# Patient Record
Sex: MALE | Race: BLACK OR AFRICAN AMERICAN | NOT HISPANIC OR LATINO | URBAN - METROPOLITAN AREA
[De-identification: names, ages, dates, MRNs, and addresses within clinical notes are randomized per-mention and may not be internally consistent; named-entity substitution may affect disease eponyms.]

---

## 2024-02-01 ENCOUNTER — HOSPITAL ENCOUNTER (OUTPATIENT)
Dept: BEHAVIORAL HEALTH | Facility: CLINIC | Age: 39
Discharge: HOME | End: 2024-02-01
Attending: PSYCHIATRY & NEUROLOGY | Admitting: PSYCHIATRY & NEUROLOGY
Payer: COMMERCIAL

## 2024-02-01 VITALS — HEIGHT: 71 IN | BODY MASS INDEX: 35.7 KG/M2 | WEIGHT: 255 LBS

## 2024-02-01 DIAGNOSIS — F14.20 COCAINE USE DISORDER, SEVERE, DEPENDENCE (H): ICD-10-CM

## 2024-02-01 DIAGNOSIS — F12.20 CANNABIS USE DISORDER, SEVERE, DEPENDENCE (H): ICD-10-CM

## 2024-02-01 DIAGNOSIS — F10.20 ALCOHOL USE DISORDER, SEVERE, DEPENDENCE (H): Primary | ICD-10-CM

## 2024-02-01 PROCEDURE — H0001 ALCOHOL AND/OR DRUG ASSESS: HCPCS

## 2024-02-01 RX ORDER — ALBUTEROL SULFATE 90 UG/1
2 AEROSOL, METERED RESPIRATORY (INHALATION) PRN
COMMUNITY
End: 2024-08-21

## 2024-02-01 ASSESSMENT — COLUMBIA-SUICIDE SEVERITY RATING SCALE - C-SSRS
REASONS FOR IDEATION LIFETIME: COMPLETELY TO END OR STOP THE PAIN (YOU COULDN'T GO ON LIVING WITH THE PAIN OR HOW YOU WERE FEELING)
TOTAL  NUMBER OF ABORTED OR SELF INTERRUPTED ATTEMPTS LIFETIME: NO
ATTEMPT LIFETIME: YES
TOTAL  NUMBER OF INTERRUPTED ATTEMPTS LIFETIME: NO
ATTEMPT PAST THREE MONTHS: NO
4. HAVE YOU HAD THESE THOUGHTS AND HAD SOME INTENTION OF ACTING ON THEM?: NO
TOTAL  NUMBER OF ACTUAL ATTEMPTS LIFETIME: 1
6. HAVE YOU EVER DONE ANYTHING, STARTED TO DO ANYTHING, OR PREPARED TO DO ANYTHING TO END YOUR LIFE?: NO
5. HAVE YOU STARTED TO WORK OUT OR WORKED OUT THE DETAILS OF HOW TO KILL YOURSELF? DO YOU INTEND TO CARRY OUT THIS PLAN?: NO
2. HAVE YOU ACTUALLY HAD ANY THOUGHTS OF KILLING YOURSELF?: SEE ABOVE
1. HAVE YOU WISHED YOU WERE DEAD OR WISHED YOU COULD GO TO SLEEP AND NOT WAKE UP?: YES
2. HAVE YOU ACTUALLY HAD ANY THOUGHTS OF KILLING YOURSELF?: NO
4. HAVE YOU HAD THESE THOUGHTS AND HAD SOME INTENTION OF ACTING ON THEM?: YES
5. HAVE YOU STARTED TO WORK OUT OR WORKED OUT THE DETAILS OF HOW TO KILL YOURSELF? DO YOU INTEND TO CARRY OUT THIS PLAN?: YES
1. IN THE PAST MONTH, HAVE YOU WISHED YOU WERE DEAD OR WISHED YOU COULD GO TO SLEEP AND NOT WAKE UP?: NO
2. HAVE YOU ACTUALLY HAD ANY THOUGHTS OF KILLING YOURSELF?: YES
3. HAVE YOU BEEN THINKING ABOUT HOW YOU MIGHT KILL YOURSELF?: YES

## 2024-02-01 ASSESSMENT — ANXIETY QUESTIONNAIRES
GAD7 TOTAL SCORE: 6
5. BEING SO RESTLESS THAT IT IS HARD TO SIT STILL: NOT AT ALL
1. FEELING NERVOUS, ANXIOUS, OR ON EDGE: SEVERAL DAYS
3. WORRYING TOO MUCH ABOUT DIFFERENT THINGS: SEVERAL DAYS
7. FEELING AFRAID AS IF SOMETHING AWFUL MIGHT HAPPEN: SEVERAL DAYS
6. BECOMING EASILY ANNOYED OR IRRITABLE: SEVERAL DAYS
4. TROUBLE RELAXING: SEVERAL DAYS
2. NOT BEING ABLE TO STOP OR CONTROL WORRYING: SEVERAL DAYS
GAD7 TOTAL SCORE: 6

## 2024-02-01 ASSESSMENT — PATIENT HEALTH QUESTIONNAIRE - PHQ9: SUM OF ALL RESPONSES TO PHQ QUESTIONS 1-9: 8

## 2024-02-01 ASSESSMENT — PAIN SCALES - GENERAL: PAINLEVEL: MODERATE PAIN (4)
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
## 2024-02-06 ENCOUNTER — TELEPHONE (OUTPATIENT)
Dept: BEHAVIORAL HEALTH | Facility: CLINIC | Age: 39
End: 2024-02-06
Payer: COMMERCIAL

## 2024-03-19 ENCOUNTER — TELEPHONE (OUTPATIENT)
Dept: BEHAVIORAL HEALTH | Facility: CLINIC | Age: 39
End: 2024-03-19
Payer: COMMERCIAL

## 2024-04-12 PROCEDURE — 88304 TISSUE EXAM BY PATHOLOGIST: CPT | Mod: TC,ORL | Performed by: COLON & RECTAL SURGERY

## 2024-04-12 PROCEDURE — 88304 TISSUE EXAM BY PATHOLOGIST: CPT | Mod: 26 | Performed by: PATHOLOGY

## 2024-04-15 ENCOUNTER — LAB REQUISITION (OUTPATIENT)
Dept: LAB | Facility: CLINIC | Age: 39
End: 2024-04-15
Payer: COMMERCIAL

## 2024-04-16 LAB
PATH REPORT.COMMENTS IMP SPEC: NORMAL
PATH REPORT.COMMENTS IMP SPEC: NORMAL
PATH REPORT.FINAL DX SPEC: NORMAL
PATH REPORT.GROSS SPEC: NORMAL
PATH REPORT.MICROSCOPIC SPEC OTHER STN: NORMAL
PATH REPORT.RELEVANT HX SPEC: NORMAL
PHOTO IMAGE: NORMAL

## 2024-08-15 ENCOUNTER — HOSPITAL ENCOUNTER (OUTPATIENT)
Dept: BEHAVIORAL HEALTH | Facility: CLINIC | Age: 39
Discharge: HOME | End: 2024-08-15
Attending: PSYCHIATRY & NEUROLOGY | Admitting: PSYCHIATRY & NEUROLOGY
Payer: COMMERCIAL

## 2024-08-15 VITALS — HEIGHT: 71 IN | WEIGHT: 234 LBS | BODY MASS INDEX: 32.76 KG/M2

## 2024-08-15 PROCEDURE — H0001 ALCOHOL AND/OR DRUG ASSESS: HCPCS

## 2024-08-15 ASSESSMENT — COLUMBIA-SUICIDE SEVERITY RATING SCALE - C-SSRS
SUICIDE, SINCE LAST CONTACT: NO
TOTAL  NUMBER OF INTERRUPTED ATTEMPTS SINCE LAST CONTACT: NO
1. SINCE LAST CONTACT, HAVE YOU WISHED YOU WERE DEAD OR WISHED YOU COULD GO TO SLEEP AND NOT WAKE UP?: NO
6. HAVE YOU EVER DONE ANYTHING, STARTED TO DO ANYTHING, OR PREPARED TO DO ANYTHING TO END YOUR LIFE?: NO
ATTEMPT SINCE LAST CONTACT: NO
TOTAL  NUMBER OF ABORTED OR SELF INTERRUPTED ATTEMPTS SINCE LAST CONTACT: NO
2. HAVE YOU ACTUALLY HAD ANY THOUGHTS OF KILLING YOURSELF?: NO

## 2024-08-15 ASSESSMENT — PAIN SCALES - GENERAL: PAINLEVEL: MILD PAIN (2)

## 2024-08-15 ASSESSMENT — ANXIETY QUESTIONNAIRES
3. WORRYING TOO MUCH ABOUT DIFFERENT THINGS: NEARLY EVERY DAY
2. NOT BEING ABLE TO STOP OR CONTROL WORRYING: NEARLY EVERY DAY
GAD7 TOTAL SCORE: 14
5. BEING SO RESTLESS THAT IT IS HARD TO SIT STILL: SEVERAL DAYS
7. FEELING AFRAID AS IF SOMETHING AWFUL MIGHT HAPPEN: MORE THAN HALF THE DAYS
4. TROUBLE RELAXING: MORE THAN HALF THE DAYS
1. FEELING NERVOUS, ANXIOUS, OR ON EDGE: MORE THAN HALF THE DAYS
GAD7 TOTAL SCORE: 14
6. BECOMING EASILY ANNOYED OR IRRITABLE: SEVERAL DAYS

## 2024-08-15 ASSESSMENT — PATIENT HEALTH QUESTIONNAIRE - PHQ9: SUM OF ALL RESPONSES TO PHQ QUESTIONS 1-9: 16
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
## 2024-08-16 ENCOUNTER — TELEPHONE (OUTPATIENT)
Dept: BEHAVIORAL HEALTH | Facility: CLINIC | Age: 39
End: 2024-08-16
Payer: COMMERCIAL

## 2024-08-16 NOTE — TELEPHONE ENCOUNTER
----- Message from Kassie COATES sent at 8/16/2024 10:31 AM CDT -----  Regarding: New [facility] Appt  Scheduling Request    Patient Name: Bakari Roberts  Location of programming: [facility]  Start Date: August / 20 / 2024  Group: B on Tuesday at 12:00 PM   Attending Provider (MD): Vidal  Number of visits to be scheduled: 28  Duration of Appointment in minutes: 24/7  Visit Type: Treatment    Additional notes:

## 2024-08-20 ENCOUNTER — HOSPITAL ENCOUNTER (OUTPATIENT)
Dept: BEHAVIORAL HEALTH | Facility: CLINIC | Age: 39
Discharge: HOME | End: 2024-08-20
Attending: FAMILY MEDICINE
Payer: COMMERCIAL

## 2024-08-20 ENCOUNTER — TELEPHONE (OUTPATIENT)
Dept: BEHAVIORAL HEALTH | Facility: CLINIC | Age: 39
End: 2024-08-20
Payer: COMMERCIAL

## 2024-08-20 VITALS
BODY MASS INDEX: 33.32 KG/M2 | TEMPERATURE: 98.1 F | HEART RATE: 70 BPM | WEIGHT: 238 LBS | OXYGEN SATURATION: 98 % | RESPIRATION RATE: 18 BRPM | DIASTOLIC BLOOD PRESSURE: 88 MMHG | SYSTOLIC BLOOD PRESSURE: 134 MMHG | HEIGHT: 71 IN

## 2024-08-20 DIAGNOSIS — Z72.51 HISTORY OF UNPROTECTED SEX: Primary | ICD-10-CM

## 2024-08-20 LAB — CREAT UR-MCNC: 69 MG/DL

## 2024-08-20 PROCEDURE — 80353 DRUG SCREENING COCAINE: CPT | Performed by: FAMILY MEDICINE

## 2024-08-20 PROCEDURE — 80346 BENZODIAZEPINES1-12: CPT | Performed by: FAMILY MEDICINE

## 2024-08-20 PROCEDURE — 1002N00001 HC LODGING PLUS FACILITY CHARGE ADULT

## 2024-08-20 PROCEDURE — H2035 A/D TX PROGRAM, PER HOUR: HCPCS

## 2024-08-20 RX ORDER — MAGNESIUM HYDROXIDE/ALUMINUM HYDROXICE/SIMETHICONE 120; 1200; 1200 MG/30ML; MG/30ML; MG/30ML
30 SUSPENSION ORAL EVERY 6 HOURS PRN
COMMUNITY

## 2024-08-20 RX ORDER — AMOXICILLIN 250 MG
2 CAPSULE ORAL DAILY PRN
COMMUNITY

## 2024-08-20 RX ORDER — IBUPROFEN 200 MG
600 TABLET ORAL EVERY 6 HOURS PRN
COMMUNITY

## 2024-08-20 RX ORDER — GUAIFENESIN/DEXTROMETHORPHAN 100-10MG/5
10 SYRUP ORAL EVERY 4 HOURS PRN
COMMUNITY

## 2024-08-20 RX ORDER — ACETAMINOPHEN 500 MG
500-1000 TABLET ORAL EVERY 8 HOURS PRN
COMMUNITY

## 2024-08-20 RX ORDER — LORATADINE 10 MG/1
10 TABLET ORAL DAILY PRN
COMMUNITY

## 2024-08-20 ASSESSMENT — ANXIETY QUESTIONNAIRES
2. NOT BEING ABLE TO STOP OR CONTROL WORRYING: NEARLY EVERY DAY
4. TROUBLE RELAXING: MORE THAN HALF THE DAYS
6. BECOMING EASILY ANNOYED OR IRRITABLE: SEVERAL DAYS
GAD7 TOTAL SCORE: 14
IF YOU CHECKED OFF ANY PROBLEMS ON THIS QUESTIONNAIRE, HOW DIFFICULT HAVE THESE PROBLEMS MADE IT FOR YOU TO DO YOUR WORK, TAKE CARE OF THINGS AT HOME, OR GET ALONG WITH OTHER PEOPLE: SOMEWHAT DIFFICULT
5. BEING SO RESTLESS THAT IT IS HARD TO SIT STILL: SEVERAL DAYS
1. FEELING NERVOUS, ANXIOUS, OR ON EDGE: NEARLY EVERY DAY
7. FEELING AFRAID AS IF SOMETHING AWFUL MIGHT HAPPEN: MORE THAN HALF THE DAYS
3. WORRYING TOO MUCH ABOUT DIFFERENT THINGS: MORE THAN HALF THE DAYS
GAD7 TOTAL SCORE: 14

## 2024-08-20 ASSESSMENT — COLUMBIA-SUICIDE SEVERITY RATING SCALE - C-SSRS
6. HAVE YOU EVER DONE ANYTHING, STARTED TO DO ANYTHING, OR PREPARED TO DO ANYTHING TO END YOUR LIFE?: YES
2. HAVE YOU ACTUALLY HAD ANY THOUGHTS OF KILLING YOURSELF LIFETIME?: NO
1. IN THE PAST MONTH, HAVE YOU WISHED YOU WERE DEAD OR WISHED YOU COULD GO TO SLEEP AND NOT WAKE UP?: YES
1. IN THE PAST MONTH, HAVE YOU WISHED YOU WERE DEAD OR WISHED YOU COULD GO TO SLEEP AND NOT WAKE UP?: YES
3. HAVE YOU BEEN THINKING ABOUT HOW YOU MIGHT KILL YOURSELF?: NO
5. HAVE YOU STARTED TO WORK OUT OR WORKED OUT THE DETAILS OF HOW TO KILL YOURSELF? DO YOU INTEND TO CARRY OUT THIS PLAN?: NO
2. HAVE YOU ACTUALLY HAD ANY THOUGHTS OF KILLING YOURSELF IN THE PAST MONTH?: YES
4. HAVE YOU HAD THESE THOUGHTS AND HAD SOME INTENTION OF ACTING ON THEM?: NO

## 2024-08-20 ASSESSMENT — PAIN SCALES - GENERAL: PAINLEVEL: NO PAIN (0)

## 2024-08-20 ASSESSMENT — PATIENT HEALTH QUESTIONNAIRE - PHQ9: SUM OF ALL RESPONSES TO PHQ QUESTIONS 1-9: 16
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
## 2024-08-20 NOTE — TELEPHONE ENCOUNTER
----- Message from Sandro Dunlap sent at 8/20/2024 12:32 PM CDT -----  Regarding: admission  Pt arrived and admitted to LP today.    ROIs signed for [facility].

## 2024-08-20 NOTE — TELEPHONE ENCOUNTER
[facility] [place]     Type of appointment acute  Any specific provider? no    Appointment Request made by [facility] RN: [phone]     Additional appointment medical/ information. Pt arrived to  without his inhaler. -if this needs to be a different type of appt than acute that is ok too    Date of discharge from [facility] 9/17    [facility]  Nurse Liaison / CD [facility] ()  [address]  O:[phone]  F:[phone]  RN [place] [id number]

## 2024-08-20 NOTE — PROGRESS NOTES
"[facility] Nursing Health Assessment      Vital signs:     /88   Pulse 70   Temp 98.1  F (36.7  C)   Resp 18   Ht 1.803 m (5' 11\")   Wt 108 kg (238 lb)   SpO2 98%   BMI 33.19 kg/m        Direct admission    Counselor: Group B  Drug of Choice: Cocaine  Last use: August 6th   Home clinic/MD: Ady Sabillon PA-C at [facility]   Psychiatrist/therapist: None     Medical history/current conditions:  Asthma, last use of inhaler was 2 months ago   Pt did have high BP before he lost weight, has not been taking medication for it      H&P Screen:  H&P within the last 90 days: Yes.  Date: Unsure of exact date, was within the last 90 days Location: [facility]       Mental Health diagnosis: None  Medication compliant?: N/A  Recent sucidal thoughts? None    When? Pt attempted suicide and was hospitalized for psychiatric care 18 years ago, was not given any diagnosis that he knows of   Current thought of self-harm? None    Plan? N/A    Pain assessment:   Pt. Experiencing pain at this time?  No      LP Falls assessment    Has patient had a fall(s) in the last 6 months?  No  If yes, what were the circumstances surrounding the fall(s)? N/A   Does patient have gait dysfunctions? (limping, dragging of toes, shuffling feet, unsteadiness, difficulty standing /walking)  No  Does patient appear to have deconditioning/muscle loss/malnutrition/fatigue? (due to inactivity, bedrest (long hospitalization), medical condition(s) No  Does patient experience confusion, dizziness or vertigo? No  Is patient visually impaired? No   Does patient have any adaptive equipment (hearing aids, wheelchair, walker, prosthesis, crutches, cane, etc..) No  Is patient taking 2 or more of the following medications?  anticonvulsants, anti-hypertensives, diuretics, laxatives, sedatives, and psychotropics (single-select) No  Is patient taking medication (s) that would cause urinary or bowel urgency (ex: lactulose/Furosemide)? " No  Does patient have a medical condition (ex: Diabetes, Liver Disease, Respiratory Diseases, Chronic Pain, Heart Disease, Neuropathy, Seizure like Disorder, etc...) that could affect balance/gait or risk for falls? No    If yes to any of the above educate patient on fall prevention while at [facility].           Floors clutter/obstacle free           Proper footwear/[place] shoes          Encourage the use of appropriate lighting.            Adjust walking speed accordingly          Recommend caution going on longer walks. Try shorter walks and see how you do first.  Use elevators when appropriate.          Notify staff if you are experiencing fatigue, weakness, drowsiness/ dizziness or have had a fall.           Use adaptive equipment as recommended          Wheelchairs must be managed and propelled independently without staff assistance           Expectation of complete independence with all cares and compliance.  Report to staff if having difficulty     ____________________________________________________________________________________________________________________________  RN Assessment of Infectious Disease concerns:    Infectious disease concerns None      _____________________________________________________________________________________________________________________________     Community Medical Screen for COVID-19    Do you have any of the following NEW or worsening symptoms NOT attributed to pre-existing conditions?    No    Fever of 100.0  F (37.8 C) or over  Chills  Cough  Shortness of Breath  Loss of taste or smell  Generalized body aches  Persistent headache  Sore throat (or trouble eating or drinking in young children?)  Nausea, Vomiting, or diarrhea (loose stools)    If pt responds YES to any of the symptoms / Positive COVID-19  without symptoms pt will need to leave unit immediately and can return in 6 days from discharge date.      Did you test positive for COVID-19 in the last 10 days  or are you waiting on the test results due to an exposure or symptoms?  No    Has anyone told you to self-quarantine due to exposure to someone with COVID-19?  No    COVID - 9 positive patients must quarantine for five days.  They can return to in-person therapy on day 6 following Duration of Special Precautions for COVID-19.      COVID-19 Test completed by LPRN ? No    COVID-19 - Pt informed of the following while at LP:    1) If pt has any of the symptoms below, notify staff immediately.    Fever   Cough   Shortness of breath or difficulty breathing   Chills   Repeated shaking with chills  Muscle pain   ____________________________________________________________________________________________________________________________    RN Assessment of Patient's Ability to Safely Manage and Self-Administer Respiratory Treatments    Has experience in the management of Respiratory (If NA, indicate and move to Integrative Therapies): Yes    Including knowledge and understanding of the importance of:    Does pt have any of the following Respiratory Illness/disorders?   Asthma, COPD, RAD, Bronchitis, Emphysema, Cystic fibrosis, DANIAL Yes    Which Acute or Chronic Respiratory Illness do you have which requires intervention? Asthma    Did pt bring all prescribed respiratory supplies? Oxygen Concentrator, CPAP, BiPap, Nebulizer, Nebulizer solution, scheduled inhaler, Rescue Inhaler  Yes   Pt understands they must carry  Rescue Inhalers  at all times Yes   Alerting staff with respiratory symptoms?  Wheezing, SOB, Tightness or pain in chest, Excessive Daytime Sleepiness Yes     Does the patient have the physical and mental ability to:     Perform respiratory cares? Oxygen Concentrator, CPAP, BiPap, Nebulizer tx, scheduled inhaler, Rescue Inhaler tx, respiratory medicines Yes   Determine when and how often to use respiratory treatments? (Oxygen Concentrator, CPAP, BiPap, Nebulizer tx, scheduled inhaler, Rescue Inhaler tx, respiratory  medicines Yes   Oxygen Concentrator, Nebulizer/CPAP/BiPAP accessories N/A   Oxygen Concentrator, CPAP/BiPAP Therapy settings N/A     Therefore does the patient, present a risk of harm to themselves or other clients in the facility if allowed to self-administer Respiratory treatments.  Consider factors above.  No    I have assessed the patient to be able to safely administer respiratory treatments.  Yes    Integrative Therapies: Essential Oils    Patient requesting essential oil inhaler to manage (Mood/Mental Health/Physical/Spiritual symptoms).     Discussed appropriate use of essential oil inhalers and instructed patient not to leave labeled product out on unit.     Patient was screened for kidney disease, asthma/reactive airway disease and rashes and wounds or 1st trimester of pregnancy    List Essential Oils requested by pt Peppermint  LIMIT ONE ESSENTIAL OIL PER PT    Patient verbalized and demonstrated understanding of how to use essential oil inhaler correctly and will notify LP RN with any concerns or side effects. Patient agrees not to share their essential oil inhaler with other clients.  Continue to support the patient in safely utilizing integrative therapies as able to manage symptoms during treatment.     Patient tobacco use:    Do you use tobacco? No     Nutritional Assessment:    Have you ever purged, binged or restricted yourself as a way to control your weight?   No     Are you on a special diet?   Yes, explain: No pork     Do you have any concerns regarding your nutritional status?   No     Have you had any appetite changes in the last 3 months?   No   Have you had weight loss or weight gain of more than 10 lbs in the last 3 months?   If patient gained or lost more than 10 lbs, then refer to program RN / attending Physician for assessment.   Yes, explain: Pt lost weight, stopped eating due to drug use    Was the patient informed of BMI?    Above,  Referral to primary care physician   Yes   Have you  engaged in any risk-taking behavior that would put you at risk for exposure to blood-borne or sexually transmitted diseases?   Yes    Do you have any dental problems?   Yes, Patient referred to go to their dentist.        Review with pt's for opioid use disorder: (Please delete below if not applicable)    Pt reporting last use of opioid on date None      Refer pt to walk-in Recovery Clinic? No    LPRN to review with pt:   Pt is expected to attend all required LP programming without staff prompting   Compliancy with all prescribed medication self-administration is required   Pt understands LP drug toxicology screening process Yes     LPRN reviewed with pt the following information:  Yes  Pt informed if leaving AMA, they will be directed to take medications with them.  Should pt's choose to leave medications at LP, ALL medications will be packaged and delivered to inpatient pharmacy for temporary storage.  Inpt pharmacy will follow protocol to reach out to pt.  If pharmacy unable to reach pt and/or pt does not retrieve medications, they will be destroyed per inpt pharmacy protocol.   In regards to 'medical concerns/medication refill expectations' while at LP:  Pt understands LP has no rounding/managing provider to assess medical issues or to refill medications.  Pt's instructed to make virtual/phone appt/s with community provider/s and notify LPRN of date and time  Pt's understand they may not leave LP to attend any medical appt's.   Pt's understand they are responsible for having a plan to refill medications and to allow time to troubleshoot.      Pt verbalizes understanding of the above criteria Yes     [facility]  Nurse Liaison / CD [facility]  O: [phone]  Fax:[phone]  LPRN [place] [id number]  M-F: 7AM to 5:30PM   Sat-Sun: 7AM to 3PM  After hours: [phone]       Nursing Assessment Summary:  See Above     On-going nursing intervention required?   No    Acute care visit  recommended: no

## 2024-08-20 NOTE — TELEPHONE ENCOUNTER
[facility] pt has history of high risk sexual activity and requests testing for sexually transmitted disease.    Requesting testing.     [facility]  Nurse Liaison / CD [facility] ([facility])  [address]  O:[phone]  F:[phone]  RN [place] [id number]  LP After hours(UC):[phone]  LP admin counselor:[phone]  CD assess:[phone]

## 2024-08-20 NOTE — PROGRESS NOTES
Initial Services Plan    Before your first treatment group, please do the following    Immediate health & safety concerns: Look for sober housing and a supportive social network.  Look for a support network (such as AA, NA, DBT group, a Latter day group, etc.)  Other: obtain a DA in next 10 days.     Suggestions for client during the time between intake & completion of treatment plan:  Tour your treatment center (unit or outpatient clinic).  Introduce yourself to the treatment group.  Spend time getting to know your peers.  Complete the problem list for your treatment plan.  Start drug and alcohol use history.  Review your patient or client handbook.    Client issues to be addressed in the first treatment sessions:  Identify outside concerns that may interfere with treatment, i.e. bills not getting paid, homesick for children  Other financial, sober living    Sandro Dunlap [facility]  8/20/2024

## 2024-08-20 NOTE — PROGRESS NOTES
Name: Bakari Roberts  Date: 8/20/2024  Medical Record: [id number]    Envelope Number: [id number]    List of Contents (List each item separately in new row):   Cell Phone with Case    Admission:  I am responsible for any personal items that are not sent to the safe or pharmacy.  [facility] is not responsible for loss, theft or damage of any property in my possession.      Patient Signature:  ___________________________________________       Date/Time:__________________________    Staff Signature: __________________________________       Date/Time:__________________________    Discharge:  [facility] has returned all of my personal belongings:    Patient Signature: ________________________________________     Date/Time: ____________________________________    Staff Signature: ______________________________________     Date/Time:_____________________________________
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
## 2024-08-20 NOTE — GROUP NOTE
Group Therapy Documentation    PATIENT'S NAME: Bakari Roberts  MRN:   [id number]  :   1985  ACCT. NUMBER: [id number]  DATE OF SERVICE: 24  START TIME: 12:30 PM  END TIME:  2:30 PM  FACILITATOR(S): Dyana Alanis LADC  TOPIC: BEH Group Therapy  Number of patients attending the group:  9    Group Length:  2 Hours    Dimensions addressed: 3, 4, and 5    Summary of Group / Topics Discussed:    Recovery Principles, Sober coping skills, Relationship/socialization, and Relapse prevention      Group Attendance:  Other - Patient not yet admitted    Patient's response to the group topic/interactions:   Patient not yet admitted    Patient appeared to be Patient no yet admitted.        Client specific details:  Patient not yet admitted.

## 2024-08-21 ENCOUNTER — OFFICE VISIT (OUTPATIENT)
Dept: FAMILY MEDICINE | Facility: CLINIC | Age: 39
End: 2024-08-21
Payer: COMMERCIAL

## 2024-08-21 ENCOUNTER — HOSPITAL ENCOUNTER (OUTPATIENT)
Dept: BEHAVIORAL HEALTH | Facility: CLINIC | Age: 39
Discharge: HOME | End: 2024-08-21
Attending: FAMILY MEDICINE
Payer: COMMERCIAL

## 2024-08-21 VITALS
TEMPERATURE: 97.7 F | OXYGEN SATURATION: 100 % | SYSTOLIC BLOOD PRESSURE: 136 MMHG | WEIGHT: 235.5 LBS | HEIGHT: 71 IN | HEART RATE: 62 BPM | BODY MASS INDEX: 32.97 KG/M2 | RESPIRATION RATE: 17 BRPM | DIASTOLIC BLOOD PRESSURE: 89 MMHG

## 2024-08-21 DIAGNOSIS — F33.0 MILD EPISODE OF RECURRENT MAJOR DEPRESSIVE DISORDER (H): ICD-10-CM

## 2024-08-21 DIAGNOSIS — J45.20 INTERMITTENT ASTHMA, UNSPECIFIED ASTHMA SEVERITY, UNSPECIFIED WHETHER COMPLICATED: Primary | ICD-10-CM

## 2024-08-21 PROBLEM — F19.20 CHEMICAL DEPENDENCY (H): Status: ACTIVE | Noted: 2024-08-21

## 2024-08-21 PROCEDURE — 1002N00001 HC LODGING PLUS FACILITY CHARGE ADULT

## 2024-08-21 PROCEDURE — H2035 A/D TX PROGRAM, PER HOUR: HCPCS | Mod: HQ

## 2024-08-21 PROCEDURE — 99213 OFFICE O/P EST LOW 20 MIN: CPT

## 2024-08-21 RX ORDER — ALBUTEROL SULFATE 90 UG/1
2 AEROSOL, METERED RESPIRATORY (INHALATION) EVERY 6 HOURS PRN
Qty: 18 G | Refills: 1 | Status: SHIPPED | OUTPATIENT
Start: 2024-08-21

## 2024-08-21 RX ORDER — ALBUTEROL SULFATE 90 UG/1
2 AEROSOL, METERED RESPIRATORY (INHALATION) PRN
Qty: 18 G | Refills: 1 | Status: SHIPPED | OUTPATIENT
Start: 2024-08-21 | End: 2024-08-21

## 2024-08-21 ASSESSMENT — PATIENT HEALTH QUESTIONNAIRE - PHQ9
SUM OF ALL RESPONSES TO PHQ QUESTIONS 1-9: 11
SUM OF ALL RESPONSES TO PHQ QUESTIONS 1-9: 11
10. IF YOU CHECKED OFF ANY PROBLEMS, HOW DIFFICULT HAVE THESE PROBLEMS MADE IT FOR YOU TO DO YOUR WORK, TAKE CARE OF THINGS AT HOME, OR GET ALONG WITH OTHER PEOPLE: NOT DIFFICULT AT ALL

## 2024-08-21 ASSESSMENT — PAIN SCALES - GENERAL: PAINLEVEL: NO PAIN (0)

## 2024-08-21 NOTE — PROGRESS NOTES
Assessment & Plan     Intermittent asthma, unspecified asthma severity, unspecified whether complicated  - albuterol (PROAIR HFA/PROVENTIL HFA/VENTOLIN HFA) 108 (90 Base) MCG/ACT inhaler; Inhale 2 puffs into the lungs as needed for shortness of breath, wheezing or cough.  Patient needs fill of asthma inhaler.  His asthma has not been acting up lately, but he did have an  inhaler.    Mild episode of recurrent major depressive disorder (H24)  - Adult Mental Health  Referral; Future  Patient has had some depression related to upsetting his family and substance use in the past.  He previously tried an antianxiety medication which was not helpful for him.  We discussed the option of medication versus therapy, and he was interested in therapy follow-up.  I placed an order for him to schedule therapy after getting out of [facility] in 28 days. Passive SI without plan.    Wenceslao Villegas is a 38 year old, presenting for the following health issues:  Asthma    Getting sober of cocaine (day 2 of [facility]).     Depression: upsetting wife and kids. Lived in hotel for 2 weeks.    Does not feel like asthma is flaring (but just needs inhaler presccription)      2024    11:29 AM   Additional Questions   Roomed by Kathy KOWALSKI     History of Present Illness     Asthma:  He presents for follow up of asthma.  He has no cough, no wheezing, and no shortness of breath.  He is using a relief medication a few times a month. He does not have a controller medication. Patient is aware of the following triggers: exercise or sports, smoke and strong odors and fumes. The patient has had a visit to the Emergency Room, Urgent Care or Hospital due to asthma since the last clinic visit. He has been to the Emergency Room or Urgent Care 0 times.He has had a Hospitalization 1 time.    He eats 0-1 servings of fruits and vegetables daily.He consumes 2 sweetened beverage(s) daily.He exercises with enough effort to increase  "his heart rate 30 to 60 minutes per day.  He exercises with enough effort to increase his heart rate 4 days per week. He is missing 7 dose(s) of medications per week.        Objective    /89 (BP Location: Right arm, Patient Position: Sitting, Cuff Size: Adult Large)   Pulse 62   Temp 97.7  F (36.5  C) (Temporal)   Resp 17   Ht 1.803 m (5' 11\")   Wt 106.8 kg (235 lb 8 oz)   SpO2 100%   BMI 32.85 kg/m    Body mass index is 32.85 kg/m .  Physical Exam   GENERAL: alert and no distress  RESP: lungs clear to auscultation - no rales, rhonchi or wheezes  CV: regular rate and rhythm, normal S1 S2, no S3 or S4, no murmur, click or rub, no peripheral edema   PSYCH: mentation appears normal, affect normal/bright          Signed Electronically by: Ricki Younger NP    "

## 2024-08-21 NOTE — GROUP NOTE
Group Therapy Documentation    PATIENT'S NAME: Bakari Roberts  MRN:   [id number]  :   1985  ACCT. NUMBER: [id number]  DATE OF SERVICE: 24  START TIME:  9:45 AM  END TIME: 11:30 AM  FACILITATOR(S): Catherine London LADC  TOPIC: BEH Group Therapy  Number of patients attending the group:  10  Group Length:  2 Hours    Dimensions addressed: 4 and 6    Summary of Group / Topics Discussed:    Recovery Principles, Relationship/socialization, and Disease of addiction      Group Attendance:  Attended group session    Patient's response to the group topic/interactions:  cooperative with task    Patient appeared to be Actively participating, Attentive, and Engaged.        Client specific details: Bakari was an active participant in morning group therapy session. The group took part in introductions of the new group members.

## 2024-08-21 NOTE — GROUP NOTE
Group Therapy Documentation    PATIENT'S NAME: Bakari Roberts  MRN:   [id number]  :   1985  ACCT. NUMBER: [id number]  DATE OF SERVICE: 24  START TIME: 12:30 PM  END TIME:  2:30 PM  FACILITATOR(S): New De Leon LADC  TOPIC: BEH Group Therapy  Number of patients attending the group:  10  Group Length:  2 Hours    Dimensions addressed: 3, 4, and 5    Summary of Group / Topics Discussed:    Recovery Principles, Cognitive behavioral therapy skills, Co-occurring illnesses symptom management, Mindfulness/Relaxation, Coping/DBT informed care, Trauma informed care, Disease of addiction, and Emotions/expression      Group Attendance:  Attended group session    Patient's response to the group topic/interactions:  cooperative with task    Patient appeared to be Attentive and Engaged.        Client specific details: Bakari participated in afternoon group. He listened to and gave positive feedback on his peer's assignment. For the second half of group he took part in a discussion on taking medications, how using alcohol and drugs effects your mental health, and on why you should not get into relationships in early sobriety.

## 2024-08-21 NOTE — GROUP NOTE
Group Therapy Documentation    PATIENT'S NAME: Bakari Roberts  MRN:   [id number]  :   1985  ACCT. NUMBER: [id number]  DATE OF SERVICE: 24  START TIME:  8:30 AM  END TIME:  9:30 AM  FACILITATOR(S): Catherine London LADC; Jamel Carr LADC  TOPIC: BEH Group Therapy  Number of patients attending the group:  21  Group Length:  1 Hours    Dimensions addressed: 3 and 5    Summary of Group / Topics Discussed:    Sober coping skills, Co-occurring illnesses symptom management, and Emotions/expression      Group Attendance:  Attended group session    Patient's response to the group topic/interactions:  cooperative with task    Patient appeared to be Attentive and Engaged.        Client specific details: Bakari was an active participant in skills group on the topic of managing stress and anxiety in recovery.

## 2024-08-22 ENCOUNTER — HOSPITAL ENCOUNTER (OUTPATIENT)
Dept: BEHAVIORAL HEALTH | Facility: CLINIC | Age: 39
Discharge: HOME | End: 2024-08-22
Attending: FAMILY MEDICINE
Payer: COMMERCIAL

## 2024-08-22 PROCEDURE — H2035 A/D TX PROGRAM, PER HOUR: HCPCS | Mod: HQ

## 2024-08-22 PROCEDURE — 1002N00001 HC LODGING PLUS FACILITY CHARGE ADULT

## 2024-08-22 NOTE — GROUP NOTE
Psychoeducation Group Documentation    PATIENT'S NAME: Bakari Roberts  MRN:   [id number]  :   1985  ACCT. NUMBER: [id number]  DATE OF SERVICE: 24  START TIME:  3:00 PM  END TIME:  4:00 PM  FACILITATOR(S): Amalia Phillips LADC; Don Gaspar LADC  TOPIC: BEH Pyschoeducation  Number of patients attending the group:  10  Group Length:  1 Hours    Skills Group Therapy Type: Recovery skills    Summary of Group / Topics Discussed:    Balanced lifestyle skills      Patients attended a skills lecture on  The Brain  to help support focus their recovery goals. Patients gained knowledge on  how to be aware with this information.  Each patient had an opportunity to process the information, ask questions and provide constructive feedback.         Group Attendance:  Attended group session    Patient's response to the group topic/interactions:  cooperative with task    Patient appeared to be Engaged.         Client specific details:  Pt, participated in lecture discussion. No immediate concerns or issues. .

## 2024-08-22 NOTE — GROUP NOTE
Group Therapy Documentation    PATIENT'S NAME: Bakari Roberts  MRN:   [id number]  :   1985  ACCT. NUMBER: [id number]  DATE OF SERVICE: 24  START TIME: 12:30 PM  END TIME:  2:30 PM  FACILITATOR(S): New De Leon LADC; Thelma Echeverria  TOPIC: BEH Group Therapy  Number of patients attending the group:  10  Group Length:  2 Hours    Dimensions addressed: 3, 4, and 5    Summary of Group / Topics Discussed:    Spirituality: Personal Growth and Development: Facilitated a group discussion around health and wellness focusing on the various aspects of spirituality. Facilitated a group discussion on spirituality and how our individual values impact our thoughts, feelings, and actions. Provided psychoeducation on the need for a balanced lifestyle and ways to achieve this. Engaged clients in a discussion around what is spirituality and their own experience to spirituality. Provided clients with handouts and worksheets.     Group Objectives/Goals:    Client will learn strategies to reduce stress and increase overall health and wellness.  Client will identify a personal understanding of spirituality.   Client will explore how their own spirituality can connect to their recovery and wellbeing.   Client will identify their personal values and how these values may influence their thoughts, feelings, and actions.   Client will implement new behaviors that facilitate ongoing wellness.       Group Attendance:  Attended group session    Patient's response to the group topic/interactions:  cooperative with task    Patient appeared to be Attentive and Engaged.        Client specific details:  Bakari participated in the afternoon spirituality group.

## 2024-08-22 NOTE — PROGRESS NOTES
Acknowledgement of Current Treatment Plan       I have reviewed my treatment plan with my therapist / counselor on 8/22/24.I agree with the plan as it is written in the electronic health record.    Name: Signature:   Bakari Roberts    Name of Therapist / Counselor: Signature:   GALLITO Boateng              I completed and turned in my Safety Plan to counselors on 8/22/24.______________________              I reported my last use date for my DOC's as 8/5/24____________________________________

## 2024-08-22 NOTE — GROUP NOTE
Group Therapy Documentation    PATIENT'S NAME: Bakari Roberts  MRN:   [id number]  :   1985  ACCT. NUMBER: [id number]  DATE OF SERVICE: 24  START TIME:  9:00 AM  END TIME: 11:00 AM  FACILITATOR(S): Catherine London LADC  TOPIC: BEH Group Therapy  Number of patients attending the group:  9  Group Length:  2 Hours    Dimensions addressed: 3, 4, 5, and 6    Summary of Group / Topics Discussed:    Sober coping skills and Disease of addiction      Group Attendance:  Attended group session    Patient's response to the group topic/interactions:  cooperative with task    Patient appeared to be Actively participating, Attentive, and Engaged.        Client specific details: Bakari was an active participant in morning group therapy session. He engaged in a discussion on avoidance and a peer graduation ceremony.

## 2024-08-23 ENCOUNTER — HOSPITAL ENCOUNTER (OUTPATIENT)
Dept: BEHAVIORAL HEALTH | Facility: CLINIC | Age: 39
Discharge: HOME | End: 2024-08-23
Attending: PSYCHIATRY & NEUROLOGY | Admitting: PSYCHIATRY & NEUROLOGY
Payer: COMMERCIAL

## 2024-08-23 ENCOUNTER — HOSPITAL ENCOUNTER (OUTPATIENT)
Dept: BEHAVIORAL HEALTH | Facility: CLINIC | Age: 39
Discharge: HOME | End: 2024-08-23
Attending: FAMILY MEDICINE
Payer: COMMERCIAL

## 2024-08-23 PROCEDURE — 90791 PSYCH DIAGNOSTIC EVALUATION: CPT

## 2024-08-23 PROCEDURE — H2035 A/D TX PROGRAM, PER HOUR: HCPCS | Mod: HQ

## 2024-08-23 PROCEDURE — 1002N00001 HC LODGING PLUS FACILITY CHARGE ADULT

## 2024-08-23 ASSESSMENT — ANXIETY QUESTIONNAIRES
6. BECOMING EASILY ANNOYED OR IRRITABLE: NOT AT ALL
5. BEING SO RESTLESS THAT IT IS HARD TO SIT STILL: NOT AT ALL
2. NOT BEING ABLE TO STOP OR CONTROL WORRYING: SEVERAL DAYS
4. TROUBLE RELAXING: SEVERAL DAYS
1. FEELING NERVOUS, ANXIOUS, OR ON EDGE: SEVERAL DAYS
GAD7 TOTAL SCORE: 4
IF YOU CHECKED OFF ANY PROBLEMS ON THIS QUESTIONNAIRE, HOW DIFFICULT HAVE THESE PROBLEMS MADE IT FOR YOU TO DO YOUR WORK, TAKE CARE OF THINGS AT HOME, OR GET ALONG WITH OTHER PEOPLE: NOT DIFFICULT AT ALL
3. WORRYING TOO MUCH ABOUT DIFFERENT THINGS: SEVERAL DAYS
GAD7 TOTAL SCORE: 4
7. FEELING AFRAID AS IF SOMETHING AWFUL MIGHT HAPPEN: NOT AT ALL

## 2024-08-23 ASSESSMENT — PAIN SCALES - GENERAL: PAINLEVEL: NO PAIN (0)

## 2024-08-23 ASSESSMENT — PATIENT HEALTH QUESTIONNAIRE - PHQ9: SUM OF ALL RESPONSES TO PHQ QUESTIONS 1-9: 9

## 2024-08-23 NOTE — GROUP NOTE
Group Therapy Documentation    PATIENT'S NAME: Bakari Roberts  MRN:   [id number]  :   1985  ACCT. NUMBER: [id number]  DATE OF SERVICE: 24  START TIME: 12:30 PM  END TIME:  2:30 PM  FACILITATOR(S): Angeles Rodrigues LADC; New De Leon LADC; Jamel Carr LADC  TOPIC: BEH Group Therapy  Number of patients attending the group:  26  Group Length:  2 Hours    Dimensions addressed: 5 and 6    Summary of Group / Topics Discussed:    Recovery Principles    Patients viewed an addiction/mental health based film. This film addressed: addiction as a disease, relationships and addiction, engagement in AA, and evaluating priorities when getting sober.   Patients engaged in a thorough discussion about the film, and shared personal experiences, and how the film helped them process their own life events.       Group Attendance:  Excused from group session due to a DA Appointment.
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
## 2024-08-23 NOTE — GROUP NOTE
Group Therapy Documentation    PATIENT'S NAME: Bakari Roberts  MRN:   [id number]  :   1985  ACCT. NUMBER: [id number]  DATE OF SERVICE: 24  START TIME:  9:00 AM  END TIME: 11:00 AM  FACILITATOR(S): Dyana Alanis LADC  TOPIC: BEH Group Therapy  Number of patients attending the group:  10    Group Length:  2 Hours    Dimensions addressed: 3, 4, and 5    Summary of Group / Topics Discussed:    Recovery Principles, Sober coping skills, Relationship/socialization, and Relapse prevention      Group Attendance:  Attended group session    Patient's response to the group topic/interactions:  cooperative with task    Patient appeared to be Actively participating, Attentive, and Engaged.        Client specific details:  Patient attended group session and was attentive and participative.

## 2024-08-24 ENCOUNTER — HOSPITAL ENCOUNTER (OUTPATIENT)
Dept: BEHAVIORAL HEALTH | Facility: CLINIC | Age: 39
Discharge: HOME | End: 2024-08-24
Attending: FAMILY MEDICINE
Payer: COMMERCIAL

## 2024-08-24 PROCEDURE — H2035 A/D TX PROGRAM, PER HOUR: HCPCS | Mod: HQ

## 2024-08-24 PROCEDURE — 1002N00001 HC LODGING PLUS FACILITY CHARGE ADULT

## 2024-08-24 NOTE — GROUP NOTE
Group Therapy Documentation    PATIENT'S NAME: Bakari Roberts  MRN:   [id number]  :   1985  ACCT. NUMBER: [id number]  DATE OF SERVICE: 24  START TIME:  9:00 AM  END TIME: 11:00 AM  FACILITATOR(S): Concepción Cantu LADC; Brittanie Callaway LADC  TOPIC: BEH Group Therapy  Number of patients attending the group:  18  Group Length:  2 Hours    Dimensions addressed: 5 and 6    Summary of Group / Topics Discussed:    Balanced lifestyle and Relapse prevention    Group Attendance:  Attended group session    Patient's response to the group topic/interactions:  cooperative with task    Patient appeared to be Attentive and Engaged.        Client specific details: Pt listened respectfully to a presentation on gratitude and recovery and took part in the related activity.

## 2024-08-24 NOTE — GROUP NOTE
Group Therapy Documentation    PATIENT'S NAME: Bakari Roberts  MRN:   [id number]  :   1985  ACCT. NUMBER: [id number]  DATE OF SERVICE: 24  START TIME: 12:30 PM  END TIME:  2:30 PM  FACILITATOR(S): Concepción Cantu LADC; Angeles Rodrigues LADC  TOPIC: BEH Group Therapy  Number of patients attending the group:  16  Group Length:  2 Hours    Dimensions addressed: 5 and 6    Summary of Group / Topics Discussed:    Relationship/socialization and Relapse prevention    Group Attendance:  Attended group session    Patient's response to the group topic/interactions:  cooperative with task    Patient appeared to be Attentive and Engaged.        Client specific details: Pt listened respectfully to a presentation on communication skills and styles and took part in the related activity.

## 2024-08-25 ENCOUNTER — HOSPITAL ENCOUNTER (OUTPATIENT)
Dept: BEHAVIORAL HEALTH | Facility: CLINIC | Age: 39
Discharge: HOME | End: 2024-08-25
Attending: FAMILY MEDICINE
Payer: COMMERCIAL

## 2024-08-25 PROCEDURE — H2035 A/D TX PROGRAM, PER HOUR: HCPCS | Mod: HQ

## 2024-08-25 PROCEDURE — 1002N00001 HC LODGING PLUS FACILITY CHARGE ADULT

## 2024-08-25 NOTE — GROUP NOTE
Group Therapy Documentation    PATIENT'S NAME: Bakari Roberts  MRN:   [id number]  :   1985  ACCT. NUMBER: [id number]  DATE OF SERVICE: 24  START TIME:  9:00 AM  END TIME: 11:00 AM  FACILITATOR(S): Concepción Cantu LADC; Javier Crandall LADC; Augustina Hernandez RN  TOPIC: BEH Group Therapy  Number of patients attending the group:  26  Group Length:  2 Hours    Dimensions addressed: 2 and 5    Summary of Group / Topics Discussed:    Balanced lifestyle and Relapse prevention    Group Attendance:  Attended group session    Patient's response to the group topic/interactions:  cooperative with task    Patient appeared to be Actively participating, Attentive, and Engaged.        Client specific details: Pt listened respectfully to a presentation on TB and STIs, and took part in the related activity.

## 2024-08-25 NOTE — GROUP NOTE
Psychoeducation Group Documentation    PATIENT'S NAME: Bakari Roberts  MRN:   [id number]  :   1985  ACCT. NUMBER: [id number]  DATE OF SERVICE: 24  START TIME: 12:15 PM  END TIME:  1:25 PM  FACILITATOR(S): Javier Crandall LADC; Concepción Cantu LADC  TOPIC: BEH Pyschoeducation  Number of patients attending the group:  26  Group Length:  1 Hours    Skills Group Therapy Type: Healthy behaviors development and Relationship skills development    Summary of Group / Topics Discussed:    Relationship/social skills        Group Attendance:  Attended group session    Patient's response to the group topic/interactions:  listened actively    Patient appeared to be Attentive and Engaged.         Client specific details:  Pt was attentive and engaged during lecture.

## 2024-08-26 ENCOUNTER — HOSPITAL ENCOUNTER (OUTPATIENT)
Dept: BEHAVIORAL HEALTH | Facility: CLINIC | Age: 39
Discharge: HOME | End: 2024-08-26
Attending: FAMILY MEDICINE
Payer: COMMERCIAL

## 2024-08-26 ENCOUNTER — LAB (OUTPATIENT)
Dept: LAB | Facility: CLINIC | Age: 39
End: 2024-08-26
Payer: COMMERCIAL

## 2024-08-26 DIAGNOSIS — Z72.51 HISTORY OF UNPROTECTED SEX: ICD-10-CM

## 2024-08-26 LAB
C TRACH DNA SPEC QL NAA+PROBE: NEGATIVE
HBV CORE AB SERPL QL IA: NONREACTIVE
HBV SURFACE AB SERPL IA-ACNC: >1000 M[IU]/ML
HBV SURFACE AB SERPL IA-ACNC: REACTIVE M[IU]/ML
HBV SURFACE AG SERPL QL IA: NONREACTIVE
HCV AB SERPL QL IA: NONREACTIVE
HIV 1+2 AB+HIV1 P24 AG SERPL QL IA: NONREACTIVE
N GONORRHOEA DNA SPEC QL NAA+PROBE: NEGATIVE
T PALLIDUM AB SER QL: NONREACTIVE

## 2024-08-26 PROCEDURE — 36415 COLL VENOUS BLD VENIPUNCTURE: CPT

## 2024-08-26 PROCEDURE — 87389 HIV-1 AG W/HIV-1&-2 AB AG IA: CPT

## 2024-08-26 PROCEDURE — 86803 HEPATITIS C AB TEST: CPT

## 2024-08-26 PROCEDURE — 1002N00001 HC LODGING PLUS FACILITY CHARGE ADULT

## 2024-08-26 PROCEDURE — 87491 CHLMYD TRACH DNA AMP PROBE: CPT

## 2024-08-26 PROCEDURE — 86704 HEP B CORE ANTIBODY TOTAL: CPT

## 2024-08-26 PROCEDURE — H2035 A/D TX PROGRAM, PER HOUR: HCPCS | Mod: HQ

## 2024-08-26 PROCEDURE — 87340 HEPATITIS B SURFACE AG IA: CPT

## 2024-08-26 PROCEDURE — 86780 TREPONEMA PALLIDUM: CPT

## 2024-08-26 PROCEDURE — 87591 N.GONORRHOEAE DNA AMP PROB: CPT

## 2024-08-26 PROCEDURE — 86706 HEP B SURFACE ANTIBODY: CPT

## 2024-08-26 NOTE — GROUP NOTE
Group Therapy Documentation    PATIENT'S NAME: Bakari Roberts  MRN:   [id number]  :   1985  ACCT. NUMBER: [id number]  DATE OF SERVICE: 24  START TIME:  9:00 AM  END TIME: 11:00 AM  FACILITATOR(S): Catherine London LADC  TOPIC: BEH Group Therapy  Number of patients attending the group:  10  Group Length:  2 Hours    Dimensions addressed: 3, 4, 5, and 6    Summary of Group / Topics Discussed:    Sober coping skills, Disease of addiction, and Relapse prevention      Group Attendance:  Attended group session    Patient's response to the group topic/interactions:  cooperative with task    Patient appeared to be Actively participating, Attentive, and Engaged.        Client specific details: Bakari was an active participant in morning group therapy session. He took part in a goal-setting exercise and offered feedback to a peer who shared their relapse prevention plan.

## 2024-08-26 NOTE — GROUP NOTE
"Group Therapy Documentation    PATIENT'S NAME: Bakari Roberts  MRN:   [id number]  :   1985  ACCT. NUMBER: [id number]  DATE OF SERVICE: 24  START TIME: 12:30 PM  END TIME:  2:30 PM  FACILITATOR(S): New De Leon LADC  TOPIC: BEH Group Therapy  Number of patients attending the group:  10  Group Length:  2 Hours    Dimensions addressed: 3, 4, 5, and 6    Summary of Group / Topics Discussed:    Recovery Principles, Mindfulness/Relaxation, Coping/DBT informed care, Trauma informed care, Disease of addiction, Emotions/expression, and Relapse prevention      Group Attendance:  Attended group session    Patient's response to the group topic/interactions:  cooperative with task    Patient appeared to be Attentive and Engaged.        Client specific details:  Bakari participated in afternoon group. He listened to and gave positive feedback on his peer's assignments. He presented his \"First Step\" assignment to the group.    "

## 2024-08-27 ENCOUNTER — HOSPITAL ENCOUNTER (OUTPATIENT)
Dept: BEHAVIORAL HEALTH | Facility: CLINIC | Age: 39
Discharge: HOME | End: 2024-08-27
Attending: FAMILY MEDICINE
Payer: COMMERCIAL

## 2024-08-27 PROCEDURE — 1002N00001 HC LODGING PLUS FACILITY CHARGE ADULT

## 2024-08-27 PROCEDURE — H2035 A/D TX PROGRAM, PER HOUR: HCPCS | Mod: HQ

## 2024-08-27 NOTE — GROUP NOTE
Psychoeducation Group Documentation    PATIENT'S NAME: Bakari Roberts  MRN:   [id number]  :   1985  ACCT. NUMBER: [id number]  DATE OF SERVICE: 24  START TIME:  3:00 PM  END TIME:  4:00 PM  FACILITATOR(S): New De Leon LADC; Rj Collins LADC  TOPIC: BEH Pyschoeducation  Number of patients attending the group:  11  Group Length:  1 Hours    Skills Group Therapy Type: Recovery skills    Summary of Group / Topics Discussed:    Balanced lifestyle skills        Group Attendance:  Attended group session    Patient's response to the group topic/interactions:  cooperative with task    Patient appeared to be Attentive and Engaged.         Client specific details:  The patient participated in the afternoon skills group on Gender Differences and Substance Use Disorder.

## 2024-08-27 NOTE — PROGRESS NOTES
[facility] Weekly Treatment Plan Review    Treatment plan review for the following date span: 8/20/24- 8/27/24    ATTENDANCE  Patient did not have any absences during this time period (list absence dates and reason for absence).        Weekly Treatment Plan Review     Treatment Plan initiated on: 8/22/24.    Dimension1: Acute Intoxication/Withdrawal Potential -   Date of Last Use: Patient reports last date of use as 8-5-24.   Any reports of withdrawal symptoms - None reported.    Dimension 2: Biomedical Conditions & Complications -   Medical Concerns: None reported.  Vitals:   BP Readings from Last 3 Encounters:   08/21/24 136/89   08/20/24 134/88     Pulse Readings from Last 3 Encounters:   08/21/24 62   08/20/24 70     Wt Readings from Last 3 Encounters:   08/21/24 106.8 kg (235 lb 8 oz)   08/20/24 108 kg (238 lb)   08/15/24 106.1 kg (234 lb)     Temp Readings from Last 3 Encounters:   08/21/24 97.7  F (36.5  C) (Temporal)   08/20/24 98.1  F (36.7  C)      Current Medications & Medication Changes:  Current Outpatient Medications   Medication Sig Dispense Refill    acetaminophen (TYLENOL) 500 MG tablet Take 500-1,000 mg by mouth every 8 hours as needed for mild pain (Patient not taking: Reported on 8/21/2024)      albuterol (PROAIR HFA/PROVENTIL HFA/VENTOLIN HFA) 108 (90 Base) MCG/ACT inhaler Inhale 2 puffs into the lungs every 6 hours as needed for shortness of breath, wheezing or cough. 18 g 1    alum & mag hydroxide-simethicone (MAALOX) 200-200-20 MG/5ML SUSP suspension Take 30 mLs by mouth every 6 hours as needed for indigestion (Patient not taking: Reported on 8/21/2024)      benzocaine-menthol (CEPACOL) 15-3.6 MG lozenge Place 1 lozenge inside cheek every 2 hours as needed for sore throat (Patient not taking: Reported on 8/21/2024)      guaiFENesin-dextromethorphan (ROBITUSSIN DM) 100-10 MG/5ML syrup Take 10 mLs by mouth every 4 hours as needed for cough (Patient not taking: Reported on 8/21/2024)       "ibuprofen (ADVIL/MOTRIN) 200 MG tablet Take 600 mg by mouth every 6 hours as needed for pain (Patient not taking: Reported on 8/21/2024)      loratadine (CLARITIN) 10 MG tablet Take 10 mg by mouth daily as needed for allergies (Patient not taking: Reported on 8/21/2024)      melatonin 5 MG tablet Take 5 mg by mouth nightly as needed for sleep (Patient not taking: Reported on 8/21/2024)      senna-docusate (SENOKOT-S/PERICOLACE) 8.6-50 MG tablet Take 2 tablets by mouth daily as needed for constipation       No current facility-administered medications for this encounter.     Facility-Administered Medications Ordered in Other Encounters   Medication Dose Route Frequency Provider Last Rate Last Admin    Self Administer Medications: Behavioral Services   Does not apply See Admin Instructions Keyla Drake MD         Taking meds as prescribed? Yes  Medication side effects or concerns: None reported.  Outside medical appointments this week (list provider and reason for visit): None reported.    Narrative: Patient seems fully functioning and seeks medical attention as needed. They attended a lecture given by  nurses on STD's/pregnancy on 8/25/24.    Dimension 3: Emotional/Behavioral Conditions & Complications -   Mental health diagnosis: Patient reports a history of anxiety disorder.    Date of last SIB: N/A  Date of  last SI: N/A  Date of last HI: N/A  Behavioral Targets:  Stabilize and maintain mental health.  Current MH Assignments:  \"Guilt and Shame\" \"Releasing Anger\"     PHQ2:       8/27/2024     1:00 PM 8/21/2024    11:10 AM   PHQ-2 ( [employer])   Q1: Little interest or pleasure in doing things 0 2   Q2: Feeling down, depressed or hopeless 0 2   PHQ-2 Score 0 4   Q1: Little interest or pleasure in doing things  More than half the days   Q2: Feeling down, depressed or hopeless  More than half the days   PHQ-2 Score  4      GAD2:       8/27/2024     1:00 PM   ROSE-2   Feeling nervous, anxious, or on edge 1   Not " "being able to stop or control worrying 1   ROSE-2 Total Score 2       Additional Narrative:  Current Mental Health symptoms include: None reported.  Active interventions to stabilize mental health symptoms this week: group therapy, lectures, and skills group. The patient reported that their mood significantly changes this past week due to their feeling \"a lot happier\" The patient reported that their stress level went down this past week due to \"being here talking to people and having support plans for the future.\" The patient reported that the coping skills they used to manage their stress and difficult emotions were \"talking to people and GOD.\"        Dimension 4: Treatment Acceptance / Resistance -   RA Diagnosis:    Alcohol Use Disorder, Severe F10.20-(303.90)  Cannabis Use Disorder, Severe, F12.20-(304.30)  Stimulant Use Disorder, Severe, F14.20-(304.20)  Commitment to tx process/Stage of change- The patient appears to be in the contemplation stage of change.  RA assignments - \"First Step\" \"Drug Use History\"   Additional Narrative - The patient reported that their main motivation to stay sober and in treatment this past week was \"my wife and kids especially my 7 year old daughter and also my job.\"       Dimension 5: Relapse / Continued Problem Potential -   Relapses this week - None  Urges to use - None  UA results - The patients last UA results on   Identified triggers - None reported.  Coping skills identified - None reported. Patient is able to utilize these skills when needed.  Relapse assignments- \"Identifying Relapse Triggers and Cues\" \"5 Years Sober vs 5 Years Using\" \"A Two Page Letter to My Family\" \"25 Sober Coping Skills\" \"Relapse Prevention Plan\"   Additional Narrative- Patient reports having cravings this past week. Pt reports craving as a 0/10, with ten being high. Patient participated in the spirituality group, facilitated by [facility] counseling staff was present during group. They " "participated in weekend workshop on relapse prevention and completed all activities.     Dimension 6: Recovery Environment -   Family Involvement - Patient reported \"my wife, my kids, and my mom.\"  Community support group attendance - Patient has been attending nightly 12-step meetings/lectures while at .  Recreational activities - Patient reported \"gym everyday and ping pong and movies.\"  Peer Relationships - Patient reported \"yes, very well\" when asked if they had gotten along with staff and peers.    Additional Narrative - Narrative - Patient has been spending time with same gender-peers and connecting with/building a sober support network. Patient reports their aftercare plan will include \"therapy, continue NA meetings, start AA meetings, work my job, and be with my family.\" They participated in weekend workshop on relationships and completed all activities.     Progress made on transition planning goals: Patient has begun working on and presenting his treatment assignments.    Justification for Continued Treatment at this Level of Care:  Risk ratings indicate continued need for this level of care. Patient has been unable to maintain abstinence from alcohol while at the outpatient level of care, lacks long-term sober maintenance skills, lacks sober coping skills and has mental health concerns which are exacerbated by substance use.  Treatment coordination activities this week: The patient met with their counselors and nursing staff.  Need for peer recovery support referral? No    Discharge Planning:  Target Discharge Date/Timeframe: TBD   Med Mgmt Provider/Appt: TBD   Ind therapy Provider/Appt: TBD   Other referrals: TBD        Dimension Scale Review     Prior ratings: Dim1 - 0 DIM2 - 1 DIM3 - 2 DIM4 - 3 DIM5 - 4 DIM6 -3     Current ratings: Dim1 - 0 DIM2 - 1 DIM3 - 2 DIM4 - 2 DIM5 - 4 DIM6 -3       If client is 18 or older, has vulnerable adult status change? No    Are Treatment Plan goals/objectives " effective? Yes  *If no, list changes to treatment plan:    Are the current goals meeting client's needs? Yes  *If no, list the changes to treatment plan.      *Client agrees with any changes to the treatment plan: N/A  *Client received copy of changes: N/A  *Client is aware of right to access a treatment plan review: Yes    GALLITO Mullins

## 2024-08-27 NOTE — GROUP NOTE
"Group Therapy Documentation    PATIENT'S NAME: Bakari Roberts  MRN:   [id number]  :   1985  ACCT. NUMBER: [id number]  DATE OF SERVICE: 24  START TIME:  9:00 AM  END TIME: 11:00 AM  FACILITATOR(S): Concepción Cantu LADC  TOPIC: BEH Group Therapy  Number of patients attending the group:  10  Group Length:  2 Hours    Dimensions addressed: 4 and 5    Summary of Group / Topics Discussed:    Disease of addiction and Emotions/expression    Group Attendance:  Attended group session    Patient's response to the group topic/interactions:  cooperative with task    Patient appeared to be Actively participating, Attentive, and Engaged.        Client specific details: Pt reported feeling peaceful and powerful, and shared that if he could change one thing about himself, it would be to worry less. He shared his \"Relapse Triggers and Cues\" assignment and was receptive to feedback.  "

## 2024-08-27 NOTE — GROUP NOTE
Group Therapy Documentation    PATIENT'S NAME: Bakari Roberts  MRN:   [id number]  :   1985  ACCT. NUMBER: [id number]  DATE OF SERVICE: 24  START TIME: 12:30 PM  END TIME:  2:30 PM  FACILITATOR(S): Dyana Alanis LADC  TOPIC: BEH Group Therapy  Number of patients attending the group:  11    Group Length:  2 Hours    Dimensions addressed: 3, 4, and 5    Summary of Group / Topics Discussed:    Recovery Principles, Sober coping skills, Relationship/socialization, and Relapse prevention      Group Attendance:  Attended group session    Patient's response to the group topic/interactions:  cooperative with task    Patient appeared to be Actively participating, Attentive, and Engaged.        Client specific details:  Patient attended group session and was attentive and participative.

## 2024-08-28 ENCOUNTER — HOSPITAL ENCOUNTER (OUTPATIENT)
Dept: BEHAVIORAL HEALTH | Facility: CLINIC | Age: 39
Discharge: HOME | End: 2024-08-28
Attending: FAMILY MEDICINE
Payer: COMMERCIAL

## 2024-08-28 PROCEDURE — H2035 A/D TX PROGRAM, PER HOUR: HCPCS | Mod: HQ

## 2024-08-28 PROCEDURE — H2035 A/D TX PROGRAM, PER HOUR: HCPCS | Mod: HQ | Performed by: COUNSELOR

## 2024-08-28 PROCEDURE — 1002N00001 HC LODGING PLUS FACILITY CHARGE ADULT

## 2024-08-28 NOTE — GROUP NOTE
Psychoeducation Group Documentation    PATIENT'S NAME: Bakari Roberts  MRN:   [id number]  :   1985  ACCT. NUMBER: [id number]  DATE OF SERVICE: 24  START TIME:  8:30 AM  END TIME:  9:30 AM  FACILITATOR(S): Amalia Phillips LADC; New De Leon LADC  TOPIC: BEH Pyschoeducation  Number of patients attending the group:  29  Group Length:  1 Hours    Skills Group Therapy Type: Recovery skills and Emotion regulation skills    Summary of Group / Topics Discussed:    Relationship/social skills and Coping/DBT skills  Facilitator presented psychoeducational group related to DBT skills including emotional regulation, mindfulness, and distress tolerance skills that can be used to better regulate emotional overwhelm.  Patients listened attentively to group.          Group Attendance:  Attended group session    Patient's response to the group topic/interactions:  cooperative with task and listened actively    Patient appeared to be Attentive and Engaged.         Client specific details:  Patient presented as attentive and engaged during this DBT lecture.

## 2024-08-28 NOTE — GROUP NOTE
Group Therapy Documentation    PATIENT'S NAME: Bakari Roberts  MRN:   [id number]  :   1985  ACCT. NUMBER: [id number]  DATE OF SERVICE: 24  START TIME:  9:45 AM  END TIME: 11:30 AM  FACILITATOR(S): Catherine London LADC  TOPIC: BEH Group Therapy  Number of patients attending the group:  10  Group Length:  2 Hours    Dimensions addressed: 3, 4, 5, and 6    Summary of Group / Topics Discussed:    Sober coping skills, Disease of addiction, and Emotions/expression      Group Attendance:  Attended group session    Patient's response to the group topic/interactions:  cooperative with task    Patient appeared to be Actively participating, Attentive, and Engaged.        Client specific details: Bakari was an active participant in group discussion on [employer] and a peer graduation ceremony.

## 2024-08-28 NOTE — ADDENDUM NOTE
Encounter addended by: Trey Adames on: 8/28/2024 1:19 AM   Actions taken: Charge Capture section accepted

## 2024-08-28 NOTE — GROUP NOTE
"Group Therapy Documentation    PATIENT'S NAME: Bakari Roberts  MRN:   [id number]  :   1985  ACCT. NUMBER: [id number]  DATE OF SERVICE: 24  START TIME: 12:30 PM  END TIME:  2:30 PM  FACILITATOR(S): Concepción Cantu LADC  TOPIC: BEH Group Therapy  Number of patients attending the group:  10  Group Length:  2 Hours    Dimensions addressed: 5 and 6    Summary of Group / Topics Discussed:    Disease of addiction, Emotions/expression, and Relapse prevention    Group Attendance:  Attended group session    Patient's response to the group topic/interactions:  cooperative with task    Patient appeared to be Actively participating, Attentive, and Engaged.        Client specific details: Pt offered supportive feedback to his peer on his \"First Step\" assignment, and took part in a group conversation about recovery environments and making decisions about changes.  "

## 2024-08-29 ENCOUNTER — HOSPITAL ENCOUNTER (OUTPATIENT)
Dept: BEHAVIORAL HEALTH | Facility: CLINIC | Age: 39
Discharge: HOME | End: 2024-08-29
Attending: FAMILY MEDICINE
Payer: COMMERCIAL

## 2024-08-29 PROCEDURE — 1002N00001 HC LODGING PLUS FACILITY CHARGE ADULT

## 2024-08-29 PROCEDURE — H2035 A/D TX PROGRAM, PER HOUR: HCPCS | Mod: HQ

## 2024-08-29 NOTE — GROUP NOTE
Group Therapy Documentation    PATIENT'S NAME: Bakari Roberts  MRN:   [id number]  :   1985  ACCT. NUMBER: [id number]  DATE OF SERVICE: 24  START TIME: 12:30 PM  END TIME:  2:30 PM  FACILITATOR(S): Catherine London LADC; Thelma Echeverria  TOPIC: BEH Group Therapy  Number of patients attending the group:  10  Group Length:  2 Hours    Dimensions addressed: 3, 5, and 6    Summary of Group / Topics Discussed:    Spiritual Care      Group Attendance:  Attended group session    Patient's response to the group topic/interactions:  cooperative with task    Patient appeared to be Actively participating, Attentive, and Engaged.        Client specific details: Bakari was an active participant in spiritual care group session led by Thelma Echeverria.

## 2024-08-29 NOTE — GROUP NOTE
Group Therapy Documentation    PATIENT'S NAME: Bakari Roberts  MRN:   [id number]  :   1985  ACCT. NUMBER: [id number]  DATE OF SERVICE: 24  START TIME:  3:00 PM  END TIME:  4:00 PM  FACILITATOR(S): Jamel Carr LADC  TOPIC: BEH Group Therapy  Number of patients attending the group:  12  Group Length:  1 Hours    Dimensions addressed: 1, 2, 3, 4, 5, and 6    Summary of Group / Topics Discussed:    Recovery Principles      Group Attendance:  Attended group session    Patient's response to the group topic/interactions:  cooperative with task    Patient appeared to be Actively participating.        Client specific details:  Bakari participated and interacted appropriately with peers and staff in Skills group. No triggers to use noted or discussed.

## 2024-08-30 ENCOUNTER — HOSPITAL ENCOUNTER (OUTPATIENT)
Dept: BEHAVIORAL HEALTH | Facility: CLINIC | Age: 39
Discharge: HOME | End: 2024-08-30
Attending: FAMILY MEDICINE
Payer: COMMERCIAL

## 2024-08-30 PROCEDURE — H2035 A/D TX PROGRAM, PER HOUR: HCPCS | Mod: HQ

## 2024-08-30 PROCEDURE — 1002N00001 HC LODGING PLUS FACILITY CHARGE ADULT

## 2024-08-30 PROCEDURE — H2035 A/D TX PROGRAM, PER HOUR: HCPCS | Mod: HQ | Performed by: COUNSELOR

## 2024-08-30 NOTE — GROUP NOTE
"Group Therapy Documentation    PATIENT'S NAME: Bakari Roberts  MRN:   [id number]  :   1985  ACCT. NUMBER: [id number]  DATE OF SERVICE: 24  START TIME:  9:00 AM  END TIME: 11:00 AM  FACILITATOR(S): Concepción Cantu LADC  TOPIC: BEH Group Therapy  Number of patients attending the group:  10  Group Length:  2 Hours    Dimensions addressed: 4 and 6    Summary of Group / Topics Discussed:    Balanced lifestyle and Emotions/expression    Group Attendance:  Attended group session    Patient's response to the group topic/interactions:  cooperative with task    Patient appeared to be Actively participating, Attentive, and Engaged.        Client specific details: Pt reported feeling sad, angry, and self-pitying, and shared that one of his core memories is being rejected by his father upon his release from [employer]. He offered supportive feedback to his peer on his \"First Step\" assignment.  "

## 2024-08-30 NOTE — GROUP NOTE
Group Therapy Documentation    PATIENT'S NAME: Bakari Roberts  MRN:   [id number]  :   1985  ACCT. NUMBER: [id number]  DATE OF SERVICE: 24  START TIME:  9:00 AM  END TIME: 11:00 AM  FACILITATOR(S): Catherine London LADC  TOPIC: BEH Group Therapy  Number of patients attending the group:  9  Group Length:  2 Hours    Dimensions addressed: 3, 4, 5, and 6    Summary of Group / Topics Discussed:    Recovery Principles, Disease of addiction, and Emotions/expression      Group Attendance:  Attended group session    Patient's response to the group topic/interactions:  cooperative with task    Patient appeared to be Actively participating, Attentive, and Engaged.        Client specific details: Bakari was an active participant in morning group therapy session. He participated in a peer graduation ceremony and offered feedback to a peer who shared his Use History assignment.

## 2024-08-30 NOTE — ADDENDUM NOTE
Encounter addended by: Concepción Cantu [facility] on: 8/30/2024 10:41 AM   Actions taken: Clinical Note Signed, Charge Capture section accepted

## 2024-08-30 NOTE — GROUP NOTE
Psychoeducation Group Documentation    PATIENT'S NAME: Bakari Roberts  MRN:   [id number]  :   1985  ACCT. NUMBER: [id number]  DATE OF SERVICE: 24  START TIME: 12:30 PM  END TIME:  2:30 PM  FACILITATOR(S): Amalia Phillips LADC; Angeles Rodrigues LADC  TOPIC: BEH Pyschoeducation  Number of patients attending the group:  28  Group Length:  2 Hours    Skills Group Therapy Type: Recovery skills, Healthy behaviors development, and Relationship skills development    Summary of Group / Topics Discussed:    Relationship/social skills, Balanced lifestyle skills, Symptom management skills, and Relapse prevention skills    Patients viewed an addiction/mental health based film. This film addressed: addiction as a disease, relationships and addiction, engagement in AA, and evaluating priorities when getting sober.   Patients engaged in a discussion about the film, shared personal experiences, and how the film helped them process their own life events.         Group Attendance:  Attended group session    Patient's response to the group topic/interactions:  cooperative with task and listened actively    Patient appeared to be Attentive and Engaged.         Client specific details:  Patient listened attentively and participated in afternoon discussion of insights into parallels in their own experience living with addiction.

## 2024-08-31 ENCOUNTER — HOSPITAL ENCOUNTER (OUTPATIENT)
Dept: BEHAVIORAL HEALTH | Facility: CLINIC | Age: 39
Discharge: HOME | End: 2024-08-31
Attending: FAMILY MEDICINE
Payer: COMMERCIAL

## 2024-08-31 PROCEDURE — H2035 A/D TX PROGRAM, PER HOUR: HCPCS | Mod: HQ

## 2024-08-31 PROCEDURE — 1002N00001 HC LODGING PLUS FACILITY CHARGE ADULT

## 2024-08-31 NOTE — GROUP NOTE
Group Therapy Documentation    PATIENT'S NAME: Bakari Roberts  MRN:   [id number]  :   1985  ACCT. NUMBER: [id number]  DATE OF SERVICE: 24  START TIME:  9:00 AM  END TIME: 11:00 AM  FACILITATOR(S): New De Leon LADC  TOPIC: BEH Group Therapy  Number of patients attending the group:  9  Group Length:  2 Hours    Dimensions addressed: 5 and 6    Summary of Group / Topics Discussed:    Recovery Principles, Relationship/socialization, Relapse prevention, and Self-care activities      Group Attendance:  Attended group session    Patient's response to the group topic/interactions:  cooperative with task    Patient appeared to be Attentive and Engaged.        Client specific details:  The patient participated in the morning lecture on Recovery Resources and how to find them.

## 2024-08-31 NOTE — GROUP NOTE
Group Therapy Documentation    PATIENT'S NAME: Bakari Roberts  MRN:   [id number]  :   1985  ACCT. NUMBER: [id number]  DATE OF SERVICE: 24  START TIME: 12:30 PM  END TIME:  2:30 PM  FACILITATOR(S): Jamel Carr LADC  TOPIC: BEH Group Therapy  Number of patients attending the group:  9  Group Length:  2 Hours    Dimensions addressed: 1, 2, 3, 4, 5, and 6    Summary of Group / Topics Discussed:    Relationship/socialization      Group Attendance:  Attended group session    Patient's response to the group topic/interactions:  cooperative with task    Patient appeared to be Actively participating.        Client specific details:  Bakari participated and interacted appropriately with peers and staff in PM lecture. No triggers to use noted or discussed.

## 2024-09-01 ENCOUNTER — HOSPITAL ENCOUNTER (OUTPATIENT)
Dept: BEHAVIORAL HEALTH | Facility: CLINIC | Age: 39
Discharge: HOME | End: 2024-09-01
Attending: FAMILY MEDICINE
Payer: COMMERCIAL

## 2024-09-01 PROCEDURE — 1002N00001 HC LODGING PLUS FACILITY CHARGE ADULT

## 2024-09-01 PROCEDURE — H2035 A/D TX PROGRAM, PER HOUR: HCPCS | Mod: HQ

## 2024-09-01 NOTE — GROUP NOTE
Psychoeducation Group Documentation    PATIENT'S NAME: Bakari Roberts  MRN:   [id number]  :   1985  ACCT. NUMBER: [id number]  DATE OF SERVICE: 24  START TIME:  8:45 AM  END TIME: 10:45 AM  FACILITATOR(S): New De Leon LADC; Meir Couch RN  TOPIC: BEH Pyschoeducation  Number of patients attending the group:  9  Group Length:  2 Hours    Skills Group Therapy Type: Healthy behaviors development and Medication education    Summary of Group / Topics Discussed:    Symptom management skills and Medication management skills        Group Attendance:  Attended group session    Patient's response to the group topic/interactions:  cooperative with task    Patient appeared to be Attentive and Engaged.         Client specific details:  The patient participated in the morning nursing lecture on HIV/AIDS.

## 2024-09-01 NOTE — GROUP NOTE
Psychoeducation Group Documentation    PATIENT'S NAME: Bakari Roberts  MRN:   [id number]  :   1985  ACCT. NUMBER: [id number]  DATE OF SERVICE: 24  START TIME: 12:30 PM  END TIME:  1:30 PM  FACILITATOR(S): Javier Crandall LADC  TOPIC: BEH Pyschoeducation  Number of patients attending the group:  27  Group Length:  1 Hours    Skills Group Therapy Type: Emotion regulation skills    Summary of Group / Topics Discussed:    Mindfulness/Relaxation skills        Group Attendance:  Attended group session    Patient's response to the group topic/interactions:  listened actively    Patient appeared to be Attentive and Engaged.         Client specific details:  Pt was attentive and engaged.

## 2024-09-02 ENCOUNTER — HOSPITAL ENCOUNTER (OUTPATIENT)
Dept: BEHAVIORAL HEALTH | Facility: CLINIC | Age: 39
Discharge: HOME | End: 2024-09-02
Attending: FAMILY MEDICINE
Payer: COMMERCIAL

## 2024-09-02 PROCEDURE — H2035 A/D TX PROGRAM, PER HOUR: HCPCS | Mod: HQ

## 2024-09-02 PROCEDURE — 1002N00001 HC LODGING PLUS FACILITY CHARGE ADULT

## 2024-09-02 NOTE — GROUP NOTE
Group Therapy Documentation    PATIENT'S NAME: Bakari Roberts  MRN:   [id number]  :   1985  ACCT. NUMBER: [id number]  DATE OF SERVICE: 24  START TIME: 12:30 PM  END TIME:  2:30 PM  FACILITATOR(S): Concepción Cantu LADC; Rj Collins LADC  TOPIC: BEH Group Therapy  Number of patients attending the group:  26  Group Length:  2 Hours    Dimensions addressed: 4, 5, and 6    Summary of Group / Topics Discussed:    Sober coping skills    Group members participated in a recovery related scavenger hunt activity, followed by a related discussion.      Group Attendance:  Attended group session    Patient's response to the group topic/interactions:  cooperative with task    Patient appeared to be Actively participating.        Client specific details:  Patient actively engaged in group discussion.

## 2024-09-02 NOTE — GROUP NOTE
"Group Therapy Documentation    PATIENT'S NAME: Bakari Roberts  MRN:   [id number]  :   1985  ACCT. NUMBER: [id number]  DATE OF SERVICE: 24  START TIME:  9:00 AM  END TIME: 11:00 AM  FACILITATOR(S): Rj Collins LADC  TOPIC: BEH Group Therapy  Number of patients attending the group:  26  Group Length:  2 Hours    Dimensions addressed: 3, 4, 5, and 6    Summary of Group / Topics Discussed:    Sober coping skills, Relationship/socialization, and Balanced lifestyle    Group participants viewed the documentary \"Happy\", followed by a related discussion.       Group Attendance:  Attended group session    Patient's response to the group topic/interactions:  cooperative with task    Patient appeared to be Actively participating.        Client specific details:  Patient actively engaged in group discussion.    "

## 2024-09-03 ENCOUNTER — HOSPITAL ENCOUNTER (OUTPATIENT)
Dept: BEHAVIORAL HEALTH | Facility: CLINIC | Age: 39
Discharge: HOME | End: 2024-09-03
Attending: FAMILY MEDICINE
Payer: COMMERCIAL

## 2024-09-03 PROCEDURE — H2035 A/D TX PROGRAM, PER HOUR: HCPCS | Mod: HQ

## 2024-09-03 PROCEDURE — H2035 A/D TX PROGRAM, PER HOUR: HCPCS | Mod: HQ | Performed by: COUNSELOR

## 2024-09-03 PROCEDURE — 1002N00001 HC LODGING PLUS FACILITY CHARGE ADULT

## 2024-09-03 NOTE — PROGRESS NOTES
[facility] Weekly Treatment Plan Review    Treatment plan review for the following date span: 8/28/24- 9/3/24    ATTENDANCE  Patient did not have any absences during this time period (list absence dates and reason for absence).        Weekly Treatment Plan Review     Treatment Plan initiated on: 8/22/24.    Dimension1: Acute Intoxication/Withdrawal Potential -   Date of Last Use: Patient reports last date of use as 8-5-24.   Any reports of withdrawal symptoms - None reported.    Dimension 2: Biomedical Conditions & Complications -   Medical Concerns: None reported.  Vitals:   BP Readings from Last 3 Encounters:   08/21/24 136/89   08/20/24 134/88     Pulse Readings from Last 3 Encounters:   08/21/24 62   08/20/24 70     Wt Readings from Last 3 Encounters:   08/21/24 106.8 kg (235 lb 8 oz)   08/20/24 108 kg (238 lb)   08/15/24 106.1 kg (234 lb)     Temp Readings from Last 3 Encounters:   08/21/24 97.7  F (36.5  C) (Temporal)   08/20/24 98.1  F (36.7  C)      Current Medications & Medication Changes:  Current Outpatient Medications   Medication Sig Dispense Refill    acetaminophen (TYLENOL) 500 MG tablet Take 500-1,000 mg by mouth every 8 hours as needed for mild pain (Patient not taking: Reported on 8/21/2024)      albuterol (PROAIR HFA/PROVENTIL HFA/VENTOLIN HFA) 108 (90 Base) MCG/ACT inhaler Inhale 2 puffs into the lungs every 6 hours as needed for shortness of breath, wheezing or cough. 18 g 1    alum & mag hydroxide-simethicone (MAALOX) 200-200-20 MG/5ML SUSP suspension Take 30 mLs by mouth every 6 hours as needed for indigestion (Patient not taking: Reported on 8/21/2024)      benzocaine-menthol (CEPACOL) 15-3.6 MG lozenge Place 1 lozenge inside cheek every 2 hours as needed for sore throat (Patient not taking: Reported on 8/21/2024)      guaiFENesin-dextromethorphan (ROBITUSSIN DM) 100-10 MG/5ML syrup Take 10 mLs by mouth every 4 hours as needed for cough (Patient not taking: Reported on 8/21/2024)       "ibuprofen (ADVIL/MOTRIN) 200 MG tablet Take 600 mg by mouth every 6 hours as needed for pain (Patient not taking: Reported on 8/21/2024)      loratadine (CLARITIN) 10 MG tablet Take 10 mg by mouth daily as needed for allergies (Patient not taking: Reported on 8/21/2024)      melatonin 5 MG tablet Take 5 mg by mouth nightly as needed for sleep (Patient not taking: Reported on 8/21/2024)      senna-docusate (SENOKOT-S/PERICOLACE) 8.6-50 MG tablet Take 2 tablets by mouth daily as needed for constipation       No current facility-administered medications for this encounter.     Facility-Administered Medications Ordered in Other Encounters   Medication Dose Route Frequency Provider Last Rate Last Admin    Self Administer Medications: Behavioral Services   Does not apply See Admin Instructions Keyla Drake MD         Taking meds as prescribed? Yes  Medication side effects or concerns: None reported.  Outside medical appointments this week (list provider and reason for visit): None reported.    Narrative: Patient seems fully functioning and seeks medical attention as needed. They attended a lecture given by  nurses on HIV/AIDS on 9/1/24.    Dimension 3: Emotional/Behavioral Conditions & Complications -   Mental health diagnosis: Patient reports a history of anxiety disorder.    Date of last SIB: N/A  Date of  last SI: N/A  Date of last HI: N/A  Behavioral Targets:  Stabilize and maintain mental health.  Current MH Assignments:  \"Guilt and Shame\" \"Releasing Anger\"     PHQ2:       9/3/2024    10:00 AM 8/27/2024     1:00 PM 8/21/2024    11:10 AM   PHQ-2 ( [employer])   Q1: Little interest or pleasure in doing things 0 0 2   Q2: Feeling down, depressed or hopeless 0 0 2   PHQ-2 Score 0 0 4   Q1: Little interest or pleasure in doing things   More than half the days   Q2: Feeling down, depressed or hopeless   More than half the days   PHQ-2 Score   4      GAD2:       8/27/2024     1:00 PM 9/3/2024    10:00 AM   ROSE-2 " "  Feeling nervous, anxious, or on edge 1 0   Not being able to stop or control worrying 1 0   ROSE-2 Total Score 2 0       Additional Narrative:  Current Mental Health symptoms include: None reported. Active interventions to stabilize mental health symptoms this week: group therapy, lectures, and skills group. The patient reported that their mood did not significantly change this past week. The patient reported that their stress level went down this past week due. The patient reported that the coping skills they used to manage their stress and difficult emotions were \"GOD and breathing.\"        Dimension 4: Treatment Acceptance / Resistance -   RA Diagnosis:    Alcohol Use Disorder, Severe F10.20-(303.90)  Cannabis Use Disorder, Severe, F12.20-(304.30)  Stimulant Use Disorder, Severe, F14.20-(304.20)  Commitment to tx process/Stage of change- The patient appears to be in the contemplation stage of change.  RA assignments - \"First Step\" \"Drug Use History\"   Additional Narrative - The patient reported that their main motivation to stay sober and in treatment this past week was \"GOD, myself, my wife and kids, and my job.\"       Dimension 5: Relapse / Continued Problem Potential -   Relapses this week - None  Urges to use - None  UA results - The patients last UA results on 8/29/24 were all negative.   Identified triggers - None reported.  Coping skills identified - None reported. Patient is able to utilize these skills when needed.  Relapse assignments- \"Identifying Relapse Triggers and Cues\" \"5 Years Sober vs 5 Years Using\" \"A Two Page Letter to My Family\" \"25 Sober Coping Skills\" \"Relapse Prevention Plan\"   Additional Narrative- Patient reports having cravings this past week. Pt reports craving as a 0/10, with ten being high. Patient participated in the spirituality group, facilitated by Thelma Blanco and  counseling staff was present during group. They participated in weekend workshop on relapse prevention and " "completed all activities.     Dimension 6: Recovery Environment -   Family Involvement - Patient reported \"my wife, my kids, and my mom.\"  Community support group attendance - Patient has been attending nightly 12-step meetings/lectures while at .  Recreational activities - Patient reported \"all.\"  Peer Relationships - Patient reported \"all\" when asked if they had gotten along with staff and peers.    Additional Narrative - Narrative - Patient has been spending time with same gender-peers and connecting with/building a sober support network. Patient reports their aftercare plan will include \"outpatient, therapy, NA, AA, Mu-ism, and the gym.\" They participated in weekend workshop on relationships and completed all activities.     Progress made on transition planning goals: Patient has begun working on and presenting his treatment assignments.    Justification for Continued Treatment at this Level of Care:  Risk ratings indicate continued need for this level of care. Patient has been unable to maintain abstinence from alcohol while at the outpatient level of care, lacks long-term sober maintenance skills, lacks sober coping skills and has mental health concerns which are exacerbated by substance use.  Treatment coordination activities this week: The patient met with their counselors and nursing staff.  Need for peer recovery support referral? No    Discharge Planning:  Target Discharge Date/Timeframe: TBD   Med Mgmt Provider/Appt: TBD   Ind therapy Provider/Appt: TBD   Other referrals: TBD        Dimension Scale Review     Prior ratings: Dim1 - 0 DIM2 - 1 DIM3 - 2 DIM4 - 3 DIM5 - 4 DIM6 -3     Current ratings: Dim1 - 0 DIM2 - 1 DIM3 - 2 DIM4 - 2 DIM5 - 4 DIM6 -3       If client is 18 or older, has vulnerable adult status change? No    Are Treatment Plan goals/objectives effective? Yes  *If no, list changes to treatment plan:    Are the current goals meeting client's needs? Yes  *If no, list the changes to treatment " plan.      *Client agrees with any changes to the treatment plan: N/A  *Client received copy of changes: N/A  *Client is aware of right to access a treatment plan review: Yes    GALLITO Mullins

## 2024-09-03 NOTE — GROUP NOTE
Group Therapy Documentation    PATIENT'S NAME: Bakari Roberts  MRN:   [id number]  :   1985  ACCT. NUMBER: [id number]  DATE OF SERVICE: 24  START TIME:  9:00 AM  END TIME: 11:00 AM  FACILITATOR(S): Catherine London LADC  TOPIC: BEH Group Therapy  Number of patients attending the group:  8  Group Length:  2 Hours    Dimensions addressed: 3, 4, 5, and 6    Summary of Group / Topics Discussed:    Recovery Principles, Disease of addiction, Emotions/expression, and Relapse prevention      Group Attendance:  Attended group session    Patient's response to the group topic/interactions:  cooperative with task    Patient appeared to be Actively participating, Attentive, and Engaged.        Client specific details: Bakari was an active participant in morning group therapy session. He engaged in a peer graduation ceremony and goal-setting exercise. He also took part in processing his decision to leave treatment early.

## 2024-09-03 NOTE — GROUP NOTE
Psychoeducation Group Documentation    PATIENT'S NAME: Bakari Roberts  MRN:   [id number]  :   1985  ACCT. NUMBER: [id number]  DATE OF SERVICE: 24  START TIME:  3:00 PM  END TIME:  4:00 PM  FACILITATOR(S): Amalia Phillips LADC  TOPIC: BEH Pyschoeducation  Number of patients attending the group:26  Group Length:  1 Hours    Skills Group Therapy Type: Recovery skills and Daily living/independence skills    Summary of Group / Topics Discussed:    Symptom management skills  Dr. Flanagan provided psychoeducation related to research on substance use trends.  Participants listened attentively and asked relevant questions to increase insight.        Group Attendance:  Attended group session    Patient's response to the group topic/interactions:  cooperative with task and listened actively    Patient appeared to be Actively participating, Attentive, and Engaged.         Client specific details:  Patient actively participated in today's skills lecture.  Patient verbalized questions and reflected insights with peers.

## 2024-09-03 NOTE — GROUP NOTE
Group Therapy Documentation    PATIENT'S NAME: Bakari Roberts  MRN:   [id number]  :   1985  ACCT. NUMBER: [id number]  DATE OF SERVICE: 24  START TIME: 12:30 PM  END TIME:  2:30 PM  FACILITATOR(S): Dyana Alanis LADC  TOPIC: BEH Group Therapy  Number of patients attending the group:  9    Group Length:  2 Hours    Dimensions addressed: 3, 4, and 5    Summary of Group / Topics Discussed:    Recovery Principles, Sober coping skills, Relationship/socialization, and Relapse prevention      Group Attendance:  Attended group session    Patient's response to the group topic/interactions:  cooperative with task    Patient appeared to be Actively participating, Attentive, and Engaged.        Client specific details:  Patient attended group session and was attentive and participative.

## 2024-09-04 ENCOUNTER — HOSPITAL ENCOUNTER (OUTPATIENT)
Dept: BEHAVIORAL HEALTH | Facility: CLINIC | Age: 39
Discharge: HOME | End: 2024-09-04
Attending: FAMILY MEDICINE
Payer: COMMERCIAL

## 2024-09-04 PROCEDURE — H2035 A/D TX PROGRAM, PER HOUR: HCPCS | Mod: HQ | Performed by: COUNSELOR

## 2024-09-04 PROCEDURE — H2035 A/D TX PROGRAM, PER HOUR: HCPCS | Mod: HQ

## 2024-09-04 PROCEDURE — 1002N00001 HC LODGING PLUS FACILITY CHARGE ADULT

## 2024-09-04 NOTE — GROUP NOTE
Group Therapy Documentation    PATIENT'S NAME: Bakari Roberts  MRN:   [id number]  :   1985  ACCT. NUMBER: [id number]  DATE OF SERVICE: 24  START TIME: 12:30 PM  END TIME:  2:30 PM  FACILITATOR(S): Amalia Phillips LADC  TOPIC: BEH Group Therapy  Number of patients attending the group: 9  Group Length:  2 Hours    Dimensions addressed: 3, 4, 5, and 6    Summary of Group / Topics Discussed:    Recovery Principles, Spiritual Care, Relationship/socialization, Cognitive behavioral therapy skills, Coping/DBT informed care, Trauma informed care, Disease of addiction, Emotions/expression, and Relapse prevention    Writer facilitated psychotherapy group targeting the above topics.  Patients identified feelings, presented assignments highlighting goals and how addiction impacts those goals.  Patients also discussed triggers and warning signs for relapse.  Patients reported increased insight into coping strategies to manage substance use disorder following group.      Group Attendance:  Attended group session    Patient's response to the group topic/interactions:  cooperative with task and listened actively    Patient appeared to be Actively participating, Attentive, and Engaged.        Client specific details:  Patient actively participated in group.  Patient presented his 5/5 assignment and received feedback.  Patient provided appropriate feedback to peers and presented personalized insights relating to topics.  Patient reported increased insight into coping skills to manage substance use disorder following group.

## 2024-09-04 NOTE — GROUP NOTE
Group Therapy Documentation    PATIENT'S NAME: Bakari Roberts  MRN:   [id number]  :   1985  ACCT. NUMBER: [id number]  DATE OF SERVICE: 24  START TIME:  8:30 AM  END TIME:  9:30 AM  FACILITATOR(S): Hubert Gonzalez MD; Rj Collins Formerly named [facility]  TOPIC: BEH Group Therapy  Number of patients attending the group:  26  Group Length:  2 Hours    Dimensions addressed: 1, 2, 3, 4, 5, and 6    Summary of Group / Topics Discussed:    Disease of addiction and Relapse prevention    Group lecture was presented by Medical Staff regarding Addiction Science and Neuroanatomy. Participants provided feedback throughout group session.      Group Attendance:  Attended group session    Patient's response to the group topic/interactions:  cooperative with task    Patient appeared to be Actively participating.        Client specific details:  Patient actively engaged in group discussion.

## 2024-09-04 NOTE — GROUP NOTE
Group Therapy Documentation    PATIENT'S NAME: Bakari Roberts  MRN:   [id number]  :   1985  ACCT. NUMBER: [id number]  DATE OF SERVICE: 24  START TIME: 9:45 AM  END TIME: 11:15 AM  FACILITATOR(S): Dyana Alanis LADC  TOPIC: BEH Group Therapy  Number of patients attending the group:  9    Group Length:  1.5 Hours    Dimensions addressed: 3, 4, and 5    Summary of Group / Topics Discussed:    Recovery Principles, Sober coping skills, Relationship/socialization, and Relapse prevention      Group Attendance:  Attended group session    Patient's response to the group topic/interactions:  cooperative with task    Patient appeared to be Actively participating, Attentive, and Engaged.        Client specific details:  Patient attended group session and was attentive and participative.

## 2024-09-05 NOTE — PROGRESS NOTES
[address]  [place] [address]              Bakari Roberts, 1985 : was admitted for evaluation/treatment of chemical dependency at [facility].  He took part in these program(s):     ______ The Inpatient Program   ______ The Outpatient Program   ___X___ The Lodging Plus Program   ______ Lodging Day Outpatient         Date admitted: 8/20/24    Date discharged: 9/5/24     Type of discharge:     ______ Satisfactory - completed evaluation / treatment   ___X__ Discharged without completing   ______ Behavioral discharge   ______ Transferred to another chemical dependency program   ______ Transferred to another type of service   ______ Left against medical advice (AMA) / Eloped               Clinicians: GALLITO Momin & GALLITO Govea, [facility]     Date: 9/5/24           Time: 2:00pm

## 2024-09-05 NOTE — ADDENDUM NOTE
Encounter addended by: Catherine London LADC on: 9/5/2024 2:14 PM   Actions taken: Clinical Note Signed

## 2024-09-06 NOTE — PROGRESS NOTES
"CHEMICAL DEPENDENCY DISCHARGE SUMMARY   NAME: Bakari Roberts  : 1985  MR # [id number]    EVALUATION COUNSELOR:  SUNNI Maya, [facility]   TREATMENT COUNSELOR:  Catherine London [facility], Concepción Cantu [facility]    REFERRAL SOURCE:  Self        PROGRAM:  [facility]    ADMISSION DATE:  24  LAST SESSION DATE:  24  DISCHARGE DATE:  24    ADMISSION DIAGNOSIS:   RA Diagnosis:  Alcohol Use Disorder   303.90 (F10.20) Severe In a controlled environment  304.30 (F12.20) Cannabis Use Disorder Severe  In a controlled environment  Stimulant Use Disorder:  In a controlled environment, Specify current severity:  Severe  304.20 (F14.20) Severe, Cocaine    DISCHARGE DIAGNOSIS:   RA Diagnosis:  Alcohol Use Disorder   303.90 (F10.20) Severe   304.30 (F12.20) Cannabis Use Disorder Severe   Stimulant Use Disorder  304.20 (F14.20) Severe, Cocaine    DISCHARGE STATUS: Patient discharged against staff recommendation.     LAST USE DATE:  24  DAYS OF TREATMENT COMPLETED:  16     PRESENTING INFORMATION:   From EHR: \"The patient reported the reason for participating in the assessment today was due to the patient's own awareness he needed help and due to pressure from his wife for him to get help.  The patient had a prior assessment with this counselor on 2024.  At that time, it had been recommended the patient enter an Intensive Outpatient program, but the patient had failed to follow through.  Prior to his last assessment on 2024, he reported relapsing with powder cocaine on 2023 after having no use of powder cocaine for a 10-month period.  The patient's relapse with powder cocaine had been from 2023 until 1/15/2024, when he reported a pattern of snorting between 1-2 grams of powder cocaine on a daily basis and he reported drinking 2 pints of hard alcohol on a daily basis.  Since his prior substance use disorder assessment on 2024, the patient " "reported having three 3-4 day relapse binges with powder cocaine and alcohol in 6/2024, in 7/2024 and in 8/2024, when he reported snorting between 1-3 grams of powder cocaine per day and drinking 2 pints of hard alcohol per day during the relapse binges.  The patient reported his relationship with his wife had deteriorated, and that she kicked the patient out of the family home around 5 days ago and the patient had been staying at a hotel on a temporary basis.\"    SERVICES PROVIDED:  Services included assessment, orientation, treatment planning, individual counseling, group therapy sessions, spiritual care counseling, aftercare planning, nutrition in recovery lecture, workshops focusing on relapse prevention and relationships, education on chemical dependency, mental illness, and AIDS/HIV awareness.     ISSUES ADDRESSED IN TREATMENT:      DIMENSION 1/ACUTE WITHDRAWAL ISSUES/DETOX:    Admit RR:   0     Current RR:  0  Focus: Recent substance uses, cravings, and urges.  Goal: Develop effective strategies to maintain sobriety and be able to manage mild to moderate withdrawal symptoms.  Intervention: Bakari reports last use date of 8-5-24.  Patient denied symptoms of post-acute withdrawal while in treatment; he agreed to report any substance use to staff and any worsening withdrawal symptoms to nurse. He attended an educational lecture on post-acute withdrawal and verbalized understanding of the timeline of symptoms.     DIMENSION 2/BIOMEDICAL:  Admit RR:     1      Current RR:  1  Focus: Patient reports no biomedical issues that would prohibit him from completing [facility] program.   Goal: Manage biomedical issues.  Intervention: Bakari denied any biomedical concerns that would interfere with treatment. Patient plans to follow up with his primary provider as needed.     DIMENSION 3/EMOTIONAL/BEHAVIORAL:   Admit RR:   2     Current RR:  2  Focus: Patient reports mental health concerns, He reports issues of guilt " "and shame exacerbated by his substance abuse, as well as mild anger management issues.  Goal: Develop coping skills necessary to mange mental health concerns, deal with guilt and shame in an appropriate manner, and to handle anger and use that energy in a positive manner.  Intervention: Bakari denies any mental health diagnoses but reported symptoms of anxiety and depression. Patient was compliant with following medication regimen while in treatment. Patient participated in a suicide risk screening on admission and was assessed as \"Low/moderate Risk.\"  Upon admission, his PHQ-9 was assessed as 16 and his ROSE-7 as 21. Patient completed a required safety plan the first week of treatment with his primary counselor. Patient denied suicidal or homicidal ideation while in treatment. Patient worked to develop a more positive self-image by completing his \"Guilt and Shame\" and \"Releasing Anger\" assignments and presenting in group. Patient demonstrated the ability to turn his negative self-talk into positive affirmations while in treatment. Patient met with staff therapist to process mental health concerns. Due to patient's continued need for increased emotional regulation and low distress tolerance, he remains at risk rating  2.      DIMENSION 4/READINESS TO CHANGE:  Admit RR:    2      Current RR:  2  Focus: Patient has continued sabotage past efforts to get and remain sober, and has continued to abuse substances despite negative consequences.  Goal: Develop skills necessary to eliminate self sabotage and remain sober long term. Develop an awareness of the connection between his substance abuse and the negative consequences he experiences.  Intervention: At time of admission, Bakari identified his primary motivation for treatment was his family. Patient was provided assignments intended to help him identify how his alcohol and drug use has contributed to violating his personal value system and to increase his internal " motivation, but he discharged from the program prior to completing them. He appears to be in the Precontemplation stage of change at this time. Patient's risk rating in this area remains a  2  as his motivation is inconsistent and relies upon active reinforcement from counselors.     DIMENSION 5/RELAPSE & CONTINUED PROBLEM POTENTIAL:  Admit RR:     4   Current RR:    4  Focus: Patient lacks awareness regarding his triggers, cues and early warning signs for relapse, as well as of his potential to relapse.  Goal: Identify and understand personal relapse and self-sabotage patterns. Develop a keen awareness of the factors that can lead to future relapses and avoid them.  Intervention: Bakari was given assignments to identify his relapse patterns. He participated in two weekend workshops on relapse prevention and completed a relapse prevention plan. He monitored any urges/cravings throughout treatment. Patient learned about boundaries and co-dependency and challenged his tendency to isolate by engaging with peers while in treatment. Patient continues to struggle with setting boundaries with unsupportive people in his life and would benefit from individual therapy. Patient's risk rating in this dimension remains a  4  based on non-completion of all treatment plan goals in this area.     DIMENSION 6/RECOVERY ENVIRONMENT: Admit RR      3      Current RR:  3  Focus: Patient lacks a sober support network and an aftercare plan.  Goal: Develop a sober support network and an aftercare plan while in [facility] program.  Intervention: Bakari attended nightly 12-step support group meetings and obtained a sponsor.  Patient worked to build supportive relationships while in treatment with same-gender peers. Patient attended a relationship workshop and received education on family roles in addiction, healthy versus unhealthy relationships, and codependency. Patient's risk rating remains a  3  based on his safe  housing.    STRENGTHS: Bakari appeared sincere in his desire to establish a recovery lifestyle, put forth consistent effort in reaching treatment plan goals and following staff recommendations, and expressed genuine concern for other group members. He freely offered support as well as encouragement, and gained considerable insight into relapse prevention strategies and resources which can be utilized in recovery. Patient was honest about the struggle/ambivalence about sobriety in the beginning of treatment. He consistently attended groups and lectures and displayed willingness to complete treatment plan assignments. Patient was able to respectfully challenge peers about addictive thinking patterns, using himself as an example. Patient added spontaneity to the group using garrulous humor.     PROGNOSIS:   Patient has a guarded prognosis, this may be upgraded to favorable assuming that he follows all the aftercare recommendations and continues to build sober support network.     LIVING ARRANGEMENTS AT DISCHARGE:  Pt plans to return to his private residence.     CONTINUING CARE RECOMMENDATIONS/REFERRALS:   Remain abstinent from all mood-altering chemicals except those prescribed and non-addictive.  Enter aftercare at a program of your choice, should you desire.  Comply with expectations of extended care.   Monitor and comply with the advice of your doctor regarding mental and physical health, remain medication compliant.  Attend a minimum of two 12-step meetings weekly and obtain a same-gender sponsor.  Continue investment in building sober support network in recovery.  Seek individual therapy with referrals given.  Continue to practice coping skills for emotional health and substance use relapse prevention.  Continue to pursue employment or volunteer opportunities.     This information has been disclosed to you from records protected by Federal confidentiality rules (42 CFR part 2). The Federal rules prohibit you  from making any further disclosure of this information unless further disclosure is expressly permitted by the written consent of the person to whom it pertains or as otherwise permitted by 42 CFR part 2. A general authorization for the release of medical or other information is NOT sufficient for this purpose. The Federal rules restrict any use of the information to criminally investigate or prosecute any alcohol or drug abuse patient.

## 2024-09-06 NOTE — ADDENDUM NOTE
Encounter addended by: Concepción Cantu LADC on: 9/6/2024 3:41 PM   Actions taken: Clinical Note Signed